# Patient Record
Sex: FEMALE | Race: WHITE | Employment: UNEMPLOYED | URBAN - METROPOLITAN AREA
[De-identification: names, ages, dates, MRNs, and addresses within clinical notes are randomized per-mention and may not be internally consistent; named-entity substitution may affect disease eponyms.]

---

## 2022-02-17 ENCOUNTER — OFFICE VISIT (OUTPATIENT)
Dept: PRIMARY CARE CLINIC | Age: 42
End: 2022-02-17
Payer: OTHER GOVERNMENT

## 2022-02-17 VITALS
OXYGEN SATURATION: 97 % | BODY MASS INDEX: 31.52 KG/M2 | SYSTOLIC BLOOD PRESSURE: 124 MMHG | WEIGHT: 208 LBS | HEIGHT: 68 IN | HEART RATE: 83 BPM | DIASTOLIC BLOOD PRESSURE: 84 MMHG

## 2022-02-17 DIAGNOSIS — I47.29 IDIOPATHIC VENTRICULAR TACHYCARDIA: ICD-10-CM

## 2022-02-17 DIAGNOSIS — Z80.3 FAMILY HISTORY OF BREAST CANCER: ICD-10-CM

## 2022-02-17 DIAGNOSIS — M79.7 FIBROMYALGIA: ICD-10-CM

## 2022-02-17 DIAGNOSIS — E55.9 VITAMIN D DEFICIENCY: ICD-10-CM

## 2022-02-17 DIAGNOSIS — M25.50 POLYARTHRALGIA: ICD-10-CM

## 2022-02-17 DIAGNOSIS — Z85.850 HISTORY OF THYROID CANCER: ICD-10-CM

## 2022-02-17 DIAGNOSIS — E03.9 HYPOTHYROIDISM, UNSPECIFIED TYPE: Primary | ICD-10-CM

## 2022-02-17 DIAGNOSIS — R23.2 HOT FLASHES: ICD-10-CM

## 2022-02-17 DIAGNOSIS — N39.3 SUI (STRESS URINARY INCONTINENCE, FEMALE): ICD-10-CM

## 2022-02-17 DIAGNOSIS — Z13.220 SCREENING FOR LIPID DISORDERS: ICD-10-CM

## 2022-02-17 PROBLEM — F32.A DEPRESSIVE DISORDER: Status: ACTIVE | Noted: 2017-02-01

## 2022-02-17 PROCEDURE — 99204 OFFICE O/P NEW MOD 45 MIN: CPT | Performed by: FAMILY MEDICINE

## 2022-02-17 RX ORDER — CARVEDILOL 25 MG/1
25 TABLET ORAL 2 TIMES DAILY
COMMUNITY
Start: 2022-01-24 | End: 2022-04-07 | Stop reason: SDUPTHER

## 2022-02-17 RX ORDER — LEVOTHYROXINE SODIUM 175 UG/1
175 TABLET ORAL DAILY
COMMUNITY
Start: 2022-01-28 | End: 2022-02-24

## 2022-02-17 SDOH — ECONOMIC STABILITY: FOOD INSECURITY: WITHIN THE PAST 12 MONTHS, YOU WORRIED THAT YOUR FOOD WOULD RUN OUT BEFORE YOU GOT MONEY TO BUY MORE.: NEVER TRUE

## 2022-02-17 SDOH — ECONOMIC STABILITY: FOOD INSECURITY: WITHIN THE PAST 12 MONTHS, THE FOOD YOU BOUGHT JUST DIDN'T LAST AND YOU DIDN'T HAVE MONEY TO GET MORE.: NEVER TRUE

## 2022-02-17 ASSESSMENT — ENCOUNTER SYMPTOMS
DIARRHEA: 0
ABDOMINAL PAIN: 0
BACK PAIN: 0
CONSTIPATION: 0
SORE THROAT: 0
COUGH: 0
CHEST TIGHTNESS: 0

## 2022-02-17 ASSESSMENT — PATIENT HEALTH QUESTIONNAIRE - PHQ9
SUM OF ALL RESPONSES TO PHQ9 QUESTIONS 1 & 2: 2
SUM OF ALL RESPONSES TO PHQ QUESTIONS 1-9: 2
2. FEELING DOWN, DEPRESSED OR HOPELESS: 1
1. LITTLE INTEREST OR PLEASURE IN DOING THINGS: 1

## 2022-02-17 ASSESSMENT — SOCIAL DETERMINANTS OF HEALTH (SDOH): HOW HARD IS IT FOR YOU TO PAY FOR THE VERY BASICS LIKE FOOD, HOUSING, MEDICAL CARE, AND HEATING?: NOT HARD AT ALL

## 2022-02-17 NOTE — PROGRESS NOTES
Subjective:     Patient ID: Vanessa Saldaña is a 39 y.o. female    HPI   This pleasant lady is new to the practice and here to get established today is from a [place] family recently moved back to this area after her 's long-term. She has a history of interventricular tachycardia and needs cardiology referral she also has a history of fibromyalgia fatigue brain fog muscle pain joint pain. She is a former nurse as well as a  lives with her  and 2 children has a history of thyroid cancer not sure of the cell type IVP that we talked about she has had a hysterectomy in the past because of ovarian cysts. Has a family history endometrial cancer ovarian cancer breast cancer. She complains of hot flashes fatigue palpitations. She had an echocardiogram that showed showed low ejection fraction. Currently taking Synthroid and carvedilol  Formally was a smoker she also drinks some alcohol and averages about 3 drinks a week. Denies being a drug user. Admits that her diet could be better that she could get more exercise      Review of Systems   Constitutional: Negative for activity change, appetite change, fatigue and fever. HENT: Negative for sore throat. Eyes: Negative for visual disturbance. Respiratory: Positive for shortness of breath. Negative for cough and chest tightness. Pneumonia 3 times     Exertional dyspnea        Snores    Poor sleep   Cardiovascular: Positive for palpitations. Negative for chest pain and leg swelling. Gastrointestinal: Negative for abdominal pain, constipation and diarrhea. Endocrine: Negative for cold intolerance. Genitourinary: Positive for frequency and urgency. Negative for dysuria. Hot flashes    Recurrent UTIs    Urgency    BETTY   Musculoskeletal: Positive for arthralgias. Negative for back pain. Neurological: Negative for dizziness, syncope and headaches. Hematological: Does not bruise/bleed easily.    Psychiatric/Behavioral: Positive for sleep disturbance. Negative for confusion and suicidal ideas. The patient is not nervous/anxious. Brain fog           Objective:     Physical Exam  Vitals and nursing note reviewed. Constitutional:       General: She is not in acute distress. Appearance: Normal appearance. She is obese. She is not ill-appearing. HENT:      Head: Normocephalic. Right Ear: External ear normal.      Left Ear: External ear normal.      Nose: Nose normal.      Mouth/Throat:      Mouth: Mucous membranes are moist.      Pharynx: Oropharynx is clear. Eyes:      Conjunctiva/sclera: Conjunctivae normal.      Pupils: Pupils are equal, round, and reactive to light. Cardiovascular:      Rate and Rhythm: Normal rate and regular rhythm. Pulses: Normal pulses. Heart sounds: Normal heart sounds. No murmur heard. Pulmonary:      Effort: Pulmonary effort is normal.      Breath sounds: Normal breath sounds. No wheezing. Musculoskeletal:         General: Normal range of motion. Cervical back: Neck supple. Right lower leg: No edema. Left lower leg: No edema. Lymphadenopathy:      Cervical: No cervical adenopathy. Skin:     General: Skin is warm and dry. Capillary Refill: Capillary refill takes less than 2 seconds. Neurological:      General: No focal deficit present. Mental Status: She is alert and oriented to person, place, and time. Psychiatric:         Mood and Affect: Mood normal.         Behavior: Behavior normal.         Assessment/Plan:     1. Hypothyroidism, unspecified type    2. Fibromyalgia    3. Idiopathic ventricular tachycardia ([address]    4. BMI 31.0-31.9,adult    5. Polyarthralgia    6. Hot flashes    7. BETTY (stress urinary incontinence, female)    8. Screening for lipid disorders    9. Vitamin D deficiency    10. History of thyroid cancer    11. Family history of breast cancer          Samantha Reynoso was seen today for [facility] care and other.     Diagnoses and all orders for this visit:    Hypothyroidism, unspecified type  -     T3, Free  -     T4, Free  -     TSH    Fibromyalgia  -     CBC with Auto Differential  -     Comprehensive Metabolic Panel  -     Sedimentation Rate    Idiopathic ventricular tachycardia (HCC)  -     CBC with Auto Differential  -     Comprehensive Metabolic Panel    BMI 29.1-84.7,WRCDL  -     CBC with Auto Differential  -     Comprehensive Metabolic Panel    Polyarthralgia  -     CBC with Auto Differential  -     Comprehensive Metabolic Panel  -     Sedimentation Rate    Hot flashes  -     Comprehensive Metabolic Panel  -     Estradiol  -     Progesterone  -     DHEA-Sulfate    BETTY (stress urinary incontinence, female)  -     Urinalysis    Screening for lipid disorders  -     Lipid Panel    Vitamin D deficiency  -     Vitamin D 25 Hydroxy    History of thyroid cancer    Family history of breast cancer      Continue her same medications for now. We will check her lab work and review her record in more depth. Return to office to discuss her labs and started treatment plan including weight loss. Discussed simple 7 anti-inflammatory lifestyle and low glycemic index    Pinky Brian MD    Please note that this chart was generated using voice recognition Dragon dictation software. Although every effort was made to ensure the accuracy of this automated transcription, some errors in transcription may have occurred.

## 2022-02-21 ASSESSMENT — ENCOUNTER SYMPTOMS: SHORTNESS OF BREATH: 1

## 2022-02-22 NOTE — PATIENT INSTRUCTIONS
We discussed the anti-inflammatory diet and low glycemic index. We also discussed carbohydrate to fiber ratio no higher than 10-1. We discussed no naked carbohydrates. Discussed the headspace meditation cachorro. Discussed 8 fit cachorro for HIIT. Discussed 150 minutes of moderate exercise weekly  Reinforced need for quality sleep  Specific for the insomnia add magnesium 400 mg 2 hours before bedtime, OLLI an  hour before bedtime, DHA fish oil 1 g in the morning.   Simple 7 goals of lifestyle modification reviewed with patient  Handout with explanations given and reviewed  Questions answered  Patient verbalized understanding

## 2022-02-23 ENCOUNTER — HOSPITAL ENCOUNTER (OUTPATIENT)
Age: 42
Setting detail: SPECIMEN
Discharge: HOME | End: 2022-02-23

## 2022-02-23 LAB
ABSOLUTE EOS #: 0.15 K/UL (ref 0–0.44)
ABSOLUTE IMMATURE GRANULOCYTE: <0.03 K/UL (ref 0–0.3)
ABSOLUTE LYMPH #: 1.93 K/UL (ref 1.1–3.7)
ABSOLUTE MONO #: 0.32 K/UL (ref 0.1–1.2)
ALBUMIN SERPL-MCNC: 4.6 G/DL (ref 3.5–5.2)
ALBUMIN/GLOBULIN RATIO: 1.8 (ref 1–2.5)
ALP BLD-CCNC: 71 U/L (ref 35–104)
ALT SERPL-CCNC: 52 U/L (ref 5–33)
ANION GAP SERPL CALCULATED.3IONS-SCNC: 13 MMOL/L (ref 9–17)
AST SERPL-CCNC: 33 U/L
BASOPHILS # BLD: 1 % (ref 0–2)
BASOPHILS ABSOLUTE: 0.05 K/UL (ref 0–0.2)
BILIRUB SERPL-MCNC: 0.26 MG/DL (ref 0.3–1.2)
BILIRUBIN URINE: NEGATIVE
BUN BLDV-MCNC: 13 MG/DL (ref 6–20)
CALCIUM SERPL-MCNC: 9.3 MG/DL (ref 8.6–10.4)
CHLORIDE BLD-SCNC: 101 MMOL/L (ref 98–107)
CHOLESTEROL/HDL RATIO: 4.6
CHOLESTEROL: 256 MG/DL
CO2: 24 MMOL/L (ref 20–31)
COLOR: YELLOW
COMMENT UA: NORMAL
CREAT SERPL-MCNC: 0.63 MG/DL (ref 0.5–0.9)
EOSINOPHILS RELATIVE PERCENT: 3 % (ref 1–4)
ESTRADIOL LEVEL: <5 PG/ML (ref 27–314)
GFR AFRICAN AMERICAN: >60 ML/MIN
GFR NON-AFRICAN AMERICAN: >60 ML/MIN
GFR SERPL CREATININE-BSD FRML MDRD: ABNORMAL ML/MIN/{1.73_M2}
GLUCOSE BLD-MCNC: 93 MG/DL (ref 70–99)
GLUCOSE URINE: NEGATIVE
HCT VFR BLD CALC: 40.1 % (ref 36.3–47.1)
HDLC SERPL-MCNC: 56 MG/DL
HEMOGLOBIN: 13.3 G/DL (ref 11.9–15.1)
IMMATURE GRANULOCYTES: 0 %
KETONES, URINE: NEGATIVE
LDL CHOLESTEROL: 185 MG/DL (ref 0–130)
LEUKOCYTE ESTERASE, URINE: NEGATIVE
LYMPHOCYTES # BLD: 39 % (ref 24–43)
MCH RBC QN AUTO: 29.7 PG (ref 25.2–33.5)
MCHC RBC AUTO-ENTMCNC: 33.2 G/DL (ref 28.4–34.8)
MCV RBC AUTO: 89.5 FL (ref 82.6–102.9)
MONOCYTES # BLD: 6 % (ref 3–12)
NITRITE, URINE: NEGATIVE
NRBC AUTOMATED: 0 PER 100 WBC
PDW BLD-RTO: 12.5 % (ref 11.8–14.4)
PH UA: 6 (ref 5–8)
PLATELET # BLD: 196 K/UL (ref 138–453)
PMV BLD AUTO: 12.3 FL (ref 8.1–13.5)
POTASSIUM SERPL-SCNC: 4.5 MMOL/L (ref 3.7–5.3)
PROGESTERONE LEVEL: <0.05 NG/ML
PROTEIN UA: NEGATIVE
RBC # BLD: 4.48 M/UL (ref 3.95–5.11)
SEDIMENTATION RATE, ERYTHROCYTE: 3 MM (ref 0–20)
SEG NEUTROPHILS: 51 % (ref 36–65)
SEGMENTED NEUTROPHILS ABSOLUTE COUNT: 2.54 K/UL (ref 1.5–8.1)
SODIUM BLD-SCNC: 138 MMOL/L (ref 135–144)
SPECIFIC GRAVITY UA: 1.01 (ref 1–1.03)
T3 FREE: 2.89 PG/ML (ref 2.02–4.43)
THYROXINE, FREE: 1.77 NG/DL (ref 0.93–1.7)
TOTAL PROTEIN: 7.2 G/DL (ref 6.4–8.3)
TRIGL SERPL-MCNC: 74 MG/DL
TSH SERPL DL<=0.05 MIU/L-ACNC: 0.62 MIU/L (ref 0.3–5)
TURBIDITY: CLEAR
URINE HGB: NEGATIVE
UROBILINOGEN, URINE: NORMAL
VITAMIN D 25-HYDROXY: 20 NG/ML (ref 30–100)
WBC # BLD: 5 K/UL (ref 3.5–11.3)

## 2022-02-24 LAB — DHEAS (DHEA SULFATE): 142 UG/DL (ref 32–240)

## 2022-02-24 RX ORDER — ERGOCALCIFEROL 1.25 MG/1
50000 CAPSULE ORAL WEEKLY
Qty: 4 CAPSULE | Refills: 5 | Status: SHIPPED | OUTPATIENT
Start: 2022-02-24 | End: 2022-05-16 | Stop reason: SDUPTHER

## 2022-02-24 RX ORDER — LEVOTHYROXINE SODIUM 137 UG/1
137 TABLET ORAL DAILY
Qty: 30 TABLET | Refills: 5 | Status: SHIPPED | OUTPATIENT
Start: 2022-02-24 | End: 2022-03-10 | Stop reason: SDUPTHER

## 2022-03-10 ENCOUNTER — OFFICE VISIT (OUTPATIENT)
Dept: PRIMARY CARE CLINIC | Age: 42
End: 2022-03-10
Payer: OTHER GOVERNMENT

## 2022-03-10 VITALS
OXYGEN SATURATION: 99 % | WEIGHT: 205.4 LBS | HEART RATE: 94 BPM | HEIGHT: 68 IN | BODY MASS INDEX: 31.13 KG/M2 | DIASTOLIC BLOOD PRESSURE: 78 MMHG | SYSTOLIC BLOOD PRESSURE: 124 MMHG

## 2022-03-10 DIAGNOSIS — R00.0 TACHYCARDIA: ICD-10-CM

## 2022-03-10 DIAGNOSIS — R23.2 HOT FLASHES: ICD-10-CM

## 2022-03-10 DIAGNOSIS — E03.9 HYPOTHYROIDISM, UNSPECIFIED TYPE: Primary | ICD-10-CM

## 2022-03-10 DIAGNOSIS — E78.2 MIXED HYPERLIPIDEMIA: ICD-10-CM

## 2022-03-10 DIAGNOSIS — R00.2 PALPITATIONS: ICD-10-CM

## 2022-03-10 PROCEDURE — 99214 OFFICE O/P EST MOD 30 MIN: CPT | Performed by: FAMILY MEDICINE

## 2022-03-10 RX ORDER — LEVOTHYROXINE SODIUM 137 UG/1
137 TABLET ORAL DAILY
Qty: 30 TABLET | Refills: 5 | Status: SHIPPED | OUTPATIENT
Start: 2022-03-10 | End: 2022-09-06 | Stop reason: SDUPTHER

## 2022-03-10 RX ORDER — ROSUVASTATIN CALCIUM 10 MG/1
10 TABLET, COATED ORAL DAILY
Qty: 30 TABLET | Refills: 3 | Status: SHIPPED | OUTPATIENT
Start: 2022-03-10 | End: 2022-06-06

## 2022-03-10 RX ORDER — PROGESTERONE 100 MG/1
100 CAPSULE ORAL NIGHTLY
Qty: 30 CAPSULE | Refills: 2 | Status: SHIPPED | OUTPATIENT
Start: 2022-03-10 | End: 2022-06-20 | Stop reason: SDUPTHER

## 2022-03-10 RX ORDER — CLINDAMYCIN HYDROCHLORIDE 300 MG/1
CAPSULE ORAL
COMMUNITY
Start: 2022-03-04 | End: 2022-06-06 | Stop reason: ALTCHOICE

## 2022-03-10 ASSESSMENT — PATIENT HEALTH QUESTIONNAIRE - PHQ9
SUM OF ALL RESPONSES TO PHQ QUESTIONS 1-9: 0
SUM OF ALL RESPONSES TO PHQ9 QUESTIONS 1 & 2: 0
SUM OF ALL RESPONSES TO PHQ QUESTIONS 1-9: 0
SUM OF ALL RESPONSES TO PHQ QUESTIONS 1-9: 0
2. FEELING DOWN, DEPRESSED OR HOPELESS: 0
1. LITTLE INTEREST OR PLEASURE IN DOING THINGS: 0
SUM OF ALL RESPONSES TO PHQ QUESTIONS 1-9: 0

## 2022-03-10 NOTE — PROGRESS NOTES
swelling. Gastrointestinal: Negative for abdominal pain, constipation and diarrhea. Endocrine: Negative for cold intolerance. Genitourinary: Positive for frequency and urgency. Negative for dysuria. Hot flashes    Recurrent UTIs    Urgency    BETTY   Musculoskeletal: Positive for arthralgias. Negative for back pain. Neurological: Negative for dizziness, syncope and headaches. Hematological: Does not bruise/bleed easily. Psychiatric/Behavioral: Positive for sleep disturbance. Negative for confusion and suicidal ideas. The patient is not nervous/anxious. Brain fog           Objective:     Physical Exam  Vitals and nursing note reviewed. Constitutional:       General: She is not in acute distress. Appearance: Normal appearance. She is obese. She is not ill-appearing. HENT:      Head: Normocephalic. Right Ear: External ear normal.      Left Ear: External ear normal.      Nose: Nose normal.      Mouth/Throat:      Mouth: Mucous membranes are moist.      Pharynx: Oropharynx is clear. Eyes:      Conjunctiva/sclera: Conjunctivae normal.      Pupils: Pupils are equal, round, and reactive to light. Cardiovascular:      Rate and Rhythm: Normal rate and regular rhythm. Pulses: Normal pulses. Heart sounds: Normal heart sounds. No murmur heard. Pulmonary:      Effort: Pulmonary effort is normal.      Breath sounds: Normal breath sounds. No wheezing. Musculoskeletal:         General: Normal range of motion. Cervical back: Neck supple. Right lower leg: No edema. Left lower leg: No edema. Lymphadenopathy:      Cervical: No cervical adenopathy. Skin:     General: Skin is warm and dry. Capillary Refill: Capillary refill takes less than 2 seconds. Neurological:      General: No focal deficit present. Mental Status: She is alert and oriented to person, place, and time.    Psychiatric:         Mood and Affect: Mood normal.         Behavior: Behavior normal.       The 10-year ASCVD risk score (Geo Mercado, et al., 2013) is: 0.9%    Values used to calculate the score:      Age: 39 years      Sex: Female      Is Non- : No      Diabetic: No      Tobacco smoker: No      Systolic Blood Pressure: 730 mmHg      Is BP treated: No      HDL Cholesterol: 56 mg/dL      Total Cholesterol: 256 mg/dL    Lab Results   Component Value Date     02/23/2022    K 4.5 02/23/2022     02/23/2022    CO2 24 02/23/2022    BUN 13 02/23/2022    CREATININE 0.63 02/23/2022    GLUCOSE 93 02/23/2022    CALCIUM 9.3 02/23/2022    PROT 7.2 02/23/2022    LABALBU 4.6 02/23/2022    BILITOT 0.26 (L) 02/23/2022    ALKPHOS 71 02/23/2022    AST 33 (H) 02/23/2022    ALT 52 (H) 02/23/2022    LABGLOM >60 02/23/2022    GFRAA >60 02/23/2022     Lab Results   Component Value Date    WBC 5.0 02/23/2022    HGB 13.3 02/23/2022    HCT 40.1 02/23/2022    MCV 89.5 02/23/2022     02/23/2022      Lab Results   Component Value Date    CHOL 256 (H) 02/23/2022     Lab Results   Component Value Date    TRIG 74 02/23/2022     Lab Results   Component Value Date    HDL 56 02/23/2022     Lab Results   Component Value Date    LDLCHOLESTEROL 185 (H) 02/23/2022     No results found for: LABVLDL, VLDL  Lab Results   Component Value Date    CHOLHDLRATIO 4.6 02/23/2022      Lab Results   Component Value Date    COLORU Yellow 02/23/2022    NITRU NEGATIVE 02/23/2022    GLUCOSEU NEGATIVE 02/23/2022    KETUA NEGATIVE 02/23/2022    UROBILINOGEN Normal 02/23/2022    BILIRUBINUR NEGATIVE 02/23/2022      Assessment/Plan:     1. Hypothyroidism, unspecified type    2. Palpitations    3. Hot flashes    4. Tachycardia    5. Mixed hyperlipidemia          Aria Boateng was seen today for other, hypothyroidism and chronic pain.     Diagnoses and all orders for this visit:    Hypothyroidism, unspecified type  -     T4, Free; Future  Synthroid 137 mcg on alternate days with 175  Palpitations  -     Echocardiogram complete; Future      Hot flashes  Trial on progesterone  Tachycardia  -     Echocardiogram complete; Future    Mixed hyperlipidemia  Start Crestor  Other orders  -     progesterone (PROMETRIUM) 100 MG CAPS capsule; Take 1 capsule by mouth nightly Take daily  -     levothyroxine (SYNTHROID) 137 MCG tablet; Take 1 tablet by mouth daily  -     rosuvastatin (CRESTOR) 10 MG tablet; Take 1 tablet by mouth daily    We will recheck her thyroid in 2 months then return the office  See patient instructions  Spent 30 minutes including time with the patient, reviewing the patient's chart, reviewing lab work, images and considering changes in treatment plan. Jolly Raza MD    Please note that this chart was generated using voice recognition Dragon dictation software. Although every effort was made to ensure the accuracy of this automated transcription, some errors in transcription may have occurred.

## 2022-03-13 ASSESSMENT — ENCOUNTER SYMPTOMS
CHEST TIGHTNESS: 0
ABDOMINAL PAIN: 0
DIARRHEA: 0
CONSTIPATION: 0
COUGH: 0
SORE THROAT: 0
SHORTNESS OF BREATH: 1
BACK PAIN: 0

## 2022-03-18 ENCOUNTER — HOSPITAL ENCOUNTER (OUTPATIENT)
Dept: NON INVASIVE DIAGNOSTICS | Age: 42
Discharge: HOME | End: 2022-03-20
Payer: OTHER GOVERNMENT

## 2022-03-18 DIAGNOSIS — R00.2 PALPITATIONS: ICD-10-CM

## 2022-03-18 DIAGNOSIS — R00.0 TACHYCARDIA: ICD-10-CM

## 2022-03-18 LAB
LV EF: 50 %
LVEF MODALITY: NORMAL

## 2022-03-18 PROCEDURE — 93306 TTE W/DOPPLER COMPLETE: CPT

## 2022-04-07 ENCOUNTER — PATIENT MESSAGE (OUTPATIENT)
Dept: PRIMARY CARE CLINIC | Age: 42
End: 2022-04-07

## 2022-04-07 RX ORDER — CARVEDILOL 25 MG/1
25 TABLET ORAL 2 TIMES DAILY
Qty: 60 TABLET | Refills: 1 | Status: SHIPPED | OUTPATIENT
Start: 2022-04-07 | End: 2022-06-13 | Stop reason: SDUPTHER

## 2022-04-07 NOTE — TELEPHONE ENCOUNTER
From: Doe Kohler  To: Dr. Tapia Kelby: 4/7/2022 10:59 AM EDT  Subject: Prescription request     I am running low on my carvedilol. Can you please send a new script to the [facility] in [place]? Im on 25 mg 2x daily currently. If you can send a 2 month supply that will get me through until my next appointment. Thanks.

## 2022-04-27 ENCOUNTER — PATIENT MESSAGE (OUTPATIENT)
Dept: PRIMARY CARE CLINIC | Age: 42
End: 2022-04-27

## 2022-04-27 RX ORDER — LEVOTHYROXINE SODIUM 175 UG/1
175 TABLET ORAL DAILY
Qty: 30 TABLET | Refills: 1 | Status: SHIPPED | OUTPATIENT
Start: 2022-04-27 | End: 2022-08-15 | Stop reason: SDUPTHER

## 2022-04-27 NOTE — TELEPHONE ENCOUNTER
From: Shareen Apley  To: Dr. Keerthi Dickey: 4/27/2022 12:38 PM EDT  Subject: Prescription refill needed    I will run out of my Levothyroxine 175 MCG before my scheduled appointment. Please send a script to [facility] for me to fill. I would appreciate a 30 day supply to get me through my appointment. I know I have labs coming up so the dosage may change at the appointment. Thanks.

## 2022-05-09 ENCOUNTER — HOSPITAL ENCOUNTER (OUTPATIENT)
Age: 42
Setting detail: SPECIMEN
Discharge: HOME | End: 2022-05-09

## 2022-05-09 DIAGNOSIS — R00.2 PALPITATIONS: ICD-10-CM

## 2022-05-09 DIAGNOSIS — E03.9 HYPOTHYROIDISM, UNSPECIFIED TYPE: ICD-10-CM

## 2022-05-09 LAB — THYROXINE, FREE: 1.57 NG/DL (ref 0.93–1.7)

## 2022-05-12 ENCOUNTER — OFFICE VISIT (OUTPATIENT)
Dept: PRIMARY CARE CLINIC | Age: 42
End: 2022-05-12
Payer: OTHER GOVERNMENT

## 2022-05-12 ENCOUNTER — PATIENT MESSAGE (OUTPATIENT)
Dept: PRIMARY CARE CLINIC | Age: 42
End: 2022-05-12

## 2022-05-12 ENCOUNTER — HOSPITAL ENCOUNTER (OUTPATIENT)
Age: 42
Setting detail: SPECIMEN
Discharge: HOME | End: 2022-05-12

## 2022-05-12 VITALS
OXYGEN SATURATION: 98 % | WEIGHT: 214.3 LBS | HEIGHT: 68 IN | DIASTOLIC BLOOD PRESSURE: 68 MMHG | SYSTOLIC BLOOD PRESSURE: 110 MMHG | BODY MASS INDEX: 32.48 KG/M2 | HEART RATE: 83 BPM

## 2022-05-12 DIAGNOSIS — E55.9 VITAMIN D DEFICIENCY: ICD-10-CM

## 2022-05-12 DIAGNOSIS — Z82.49 FAMILY HISTORY OF HEART DISEASE: ICD-10-CM

## 2022-05-12 DIAGNOSIS — E03.9 HYPOTHYROIDISM, UNSPECIFIED TYPE: ICD-10-CM

## 2022-05-12 DIAGNOSIS — R00.2 PALPITATIONS: ICD-10-CM

## 2022-05-12 DIAGNOSIS — R53.83 FATIGUE, UNSPECIFIED TYPE: ICD-10-CM

## 2022-05-12 DIAGNOSIS — R06.09 EXERTIONAL DYSPNEA: Primary | ICD-10-CM

## 2022-05-12 LAB — VITAMIN D 25-HYDROXY: 26.8 NG/ML

## 2022-05-12 PROCEDURE — 99214 OFFICE O/P EST MOD 30 MIN: CPT | Performed by: FAMILY MEDICINE

## 2022-05-12 ASSESSMENT — PATIENT HEALTH QUESTIONNAIRE - PHQ9
SUM OF ALL RESPONSES TO PHQ QUESTIONS 1-9: 2
1. LITTLE INTEREST OR PLEASURE IN DOING THINGS: 1
2. FEELING DOWN, DEPRESSED OR HOPELESS: 1
SUM OF ALL RESPONSES TO PHQ QUESTIONS 1-9: 2
SUM OF ALL RESPONSES TO PHQ9 QUESTIONS 1 & 2: 2

## 2022-05-12 NOTE — PROGRESS NOTES
Subjective:     Patient ID: Haseeb Leone is a 39 y.o. female    HPI  Patient returns today for follow-up on her hypothyroidism hyperlipidemia palpitations fatigue shortness of breath and weight gain. At her last visit and in response to her lab work we modified her Synthroid dose as recorded in the chart such that she did not waste any pills. We will recheck her thyroid levels 3 months after the dose adjustment. Seems to be tolerating it well and she does feel somewhat better. We discussed her hyperlipidemia. Most concerning to her is her shortness of breath. Any type of exertion including grocery shopping seems to exhaust her. She has chest tightness and shortness of breath even just walking from the parking lot. The chest pain is not crushing as with think of with angina and it does not radiate. More shortness of breath and fatigue. Reviewed her echocardiogram from before and she has adequate systolic function. However with this expression of exertional dyspnea and fatigue I think it is important that we do a stress test on her. She does have a family history of heart disease      Review of Systems   Constitutional: Positive for fatigue and unexpected weight change. Negative for fever. HENT: Negative for sore throat. Respiratory: Positive for chest tightness and shortness of breath. Negative for cough, choking and wheezing. Cardiovascular: Positive for chest pain and palpitations. Gastrointestinal: Negative for abdominal pain. Genitourinary: Negative for dysuria. Musculoskeletal: Negative for arthralgias. Neurological: Negative for light-headedness and headaches. Psychiatric/Behavioral: Negative for sleep disturbance. The patient is not nervous/anxious. Objective:     Physical Exam  Vitals and nursing note reviewed. Constitutional:       Appearance: Normal appearance. HENT:      Head: Normocephalic.       Right Ear: External ear normal.      Left Ear: External ear normal.      Nose: Nose normal.      Mouth/Throat:      Mouth: Mucous membranes are moist.      Pharynx: Oropharynx is clear. Eyes:      Conjunctiva/sclera: Conjunctivae normal.      Pupils: Pupils are equal, round, and reactive to light. Cardiovascular:      Rate and Rhythm: Normal rate and regular rhythm. Pulses: Normal pulses. Heart sounds: Normal heart sounds. No murmur heard. No gallop. Pulmonary:      Effort: Pulmonary effort is normal. No respiratory distress. Breath sounds: Normal breath sounds. No wheezing or rhonchi. Musculoskeletal:         General: Normal range of motion. Cervical back: Neck supple. Right lower leg: No edema. Left lower leg: No edema. Lymphadenopathy:      Cervical: No cervical adenopathy. Skin:     General: Skin is warm and dry. Capillary Refill: Capillary refill takes less than 2 seconds. Neurological:      General: No focal deficit present. Mental Status: She is alert and oriented to person, place, and time. Psychiatric:         Mood and Affect: Mood normal.         Behavior: Behavior normal.         Assessment/Plan:     1. Exertional dyspnea    2. Fatigue, unspecified type    3. Hypothyroidism, unspecified type    4. Palpitations    5. Family history of heart disease    6. Vitamin D deficiency          Deborrkarissa El was seen today for hypothyroidism, cholesterol problem, palpitations, fatigue, shortness of breath and weight gain. Diagnoses and all orders for this visit:    Exertional dyspnea  -     Cardiac Stress Test - w/Pharm; Future    Fatigue, unspecified type  -     Vitamin D 25 Hydroxy; Future  -     Cardiac Stress Test - w/Pharm; Future    Hypothyroidism, unspecified type  Continue dose of Synthroid as instructed  Palpitations  -     Cardiac Stress Test - w/Pharm; Future    Family history of heart disease  -     Cardiac Stress Test - w/Pharm; Future    Vitamin D deficiency  -     Vitamin D 25 Hydroxy;  Future    Meds are reviewed answered questions return to office to discuss results of stress test      Johnny Ramos MD    Please note that this chart was generated using voice recognition Dragon dictation software. Although every effort was made to ensure the accuracy of this automated transcription, some errors in transcription may have occurred.

## 2022-05-13 NOTE — TELEPHONE ENCOUNTER
From: Mayda Rangel  To: Dr. Buckner Hole: 5/12/2022 5:08 PM EDT  Subject: Question regarding VITAMIN D 25 HYDROXY    Ill need an new script for the weekly dose, and a script for the daily.  Thanks

## 2022-05-16 RX ORDER — ERGOCALCIFEROL 1.25 MG/1
50000 CAPSULE ORAL WEEKLY
Qty: 4 CAPSULE | Refills: 5 | Status: SHIPPED | OUTPATIENT
Start: 2022-05-16 | End: 2022-09-06 | Stop reason: SDUPTHER

## 2022-05-17 ASSESSMENT — ENCOUNTER SYMPTOMS
ABDOMINAL PAIN: 0
SORE THROAT: 0
CHOKING: 0
CHEST TIGHTNESS: 1
SHORTNESS OF BREATH: 1
WHEEZING: 0
COUGH: 0

## 2022-05-19 ENCOUNTER — OFFICE VISIT (OUTPATIENT)
Dept: PRIMARY CARE CLINIC | Age: 42
End: 2022-05-19
Payer: OTHER GOVERNMENT

## 2022-05-19 VITALS
HEIGHT: 68 IN | BODY MASS INDEX: 32.52 KG/M2 | OXYGEN SATURATION: 98 % | HEART RATE: 96 BPM | SYSTOLIC BLOOD PRESSURE: 110 MMHG | WEIGHT: 214.6 LBS | DIASTOLIC BLOOD PRESSURE: 68 MMHG

## 2022-05-19 DIAGNOSIS — M54.6 ACUTE RIGHT-SIDED THORACIC BACK PAIN: Primary | ICD-10-CM

## 2022-05-19 DIAGNOSIS — S29.011A INTERCOSTAL MUSCLE STRAIN, INITIAL ENCOUNTER: ICD-10-CM

## 2022-05-19 PROCEDURE — 99213 OFFICE O/P EST LOW 20 MIN: CPT

## 2022-05-19 PROCEDURE — 96372 THER/PROPH/DIAG INJ SC/IM: CPT

## 2022-05-19 RX ORDER — CYCLOBENZAPRINE HCL 10 MG
10 TABLET ORAL 3 TIMES DAILY PRN
Qty: 21 TABLET | Refills: 0 | Status: SHIPPED | OUTPATIENT
Start: 2022-05-19 | End: 2022-05-29

## 2022-05-19 RX ORDER — TRIAMCINOLONE ACETONIDE 40 MG/ML
40 INJECTION, SUSPENSION INTRA-ARTICULAR; INTRAMUSCULAR ONCE
Status: COMPLETED | OUTPATIENT
Start: 2022-05-19 | End: 2022-05-19

## 2022-05-19 RX ORDER — IBUPROFEN 600 MG/1
600 TABLET ORAL 4 TIMES DAILY PRN
Qty: 120 TABLET | Refills: 0 | Status: SHIPPED | OUTPATIENT
Start: 2022-05-19

## 2022-05-19 RX ADMIN — TRIAMCINOLONE ACETONIDE 40 MG: 40 INJECTION, SUSPENSION INTRA-ARTICULAR; INTRAMUSCULAR at 12:34

## 2022-05-19 ASSESSMENT — ENCOUNTER SYMPTOMS
ABDOMINAL PAIN: 0
NAUSEA: 0
BOWEL INCONTINENCE: 0
SHORTNESS OF BREATH: 0
VOMITING: 0
BACK PAIN: 1
COUGH: 0

## 2022-05-19 NOTE — PROGRESS NOTES
[address] WALK-IN  [address] [place] [address] WALK-IN  [address],  [address]  [address] [place] [address]  Dept: [phone]    Kim Burks is a 39 y.o. female Established patient, who presents to the walk-in clinic today with conditions/complaints as noted below:    Chief Complaint   Patient presents with    Lower Back Pain     had massage  on Monday, moving furniture today now mid back pain. no truama         HPI:     Back Pain  This is a new problem. The current episode started in the past 7 days (on Monday she felt a \"pop\" during her massage). The problem occurs constantly. The problem has been gradually worsening (after moving furniture today) since onset. The pain is present in the thoracic spine. The quality of the pain is described as stabbing and cramping. Radiates to: right ribs/lateral chest wall. The pain is at a severity of 3/10. The pain is the same all the time. The symptoms are aggravated by twisting and bending (lifting). Pertinent negatives include no abdominal pain, bladder incontinence, bowel incontinence, chest pain, dysuria, fever, leg pain, numbness, perianal numbness, tingling or weakness. She has tried analgesics, NSAIDs and bed rest (acetaminophen, advil) for the symptoms. The treatment provided no relief.        Past Medical History:   Diagnosis Date    Allergic rhinitis 2005    Anxiety 1999    Cancer [facility])     thyroid    Chronic back pain 2000    Depression     hx of depression and panic attacks, not bad since around 2010    Fibromyalgia     Headache 1990    Hypothyroidism 2002    Idiopathic ventricular tachy[address] ([facility] [address]     Obesity 2002       Current Outpatient Medications   Medication Sig Dispense Refill    ibuprofen (ADVIL;MOTRIN) 600 MG tablet Take 1 tablet by mouth 4 times daily as needed for Pain 120 tablet 0  cyclobenzaprine (FLEXERIL) 10 MG tablet Take 1 tablet by mouth 3 times daily as needed for Muscle spasms 21 tablet 0    vitamin D (ERGOCALCIFEROL) 1.25 MG (63975 UT) CAPS capsule Take 1 capsule by mouth once a week 4 capsule 5    levothyroxine (SYNTHROID) 175 MCG tablet Take 1 tablet by mouth daily (Patient taking differently: Take 175 mcg by mouth daily 4x/week) 30 tablet 1    carvedilol (COREG) 25 MG tablet Take 1 tablet by mouth 2 times daily 60 tablet 1    progesterone (PROMETRIUM) 100 MG CAPS capsule Take 1 capsule by mouth nightly Take daily 30 capsule 2    levothyroxine (SYNTHROID) 137 MCG tablet Take 1 tablet by mouth daily (Patient taking differently: Take 137 mcg by mouth daily 3x/week) 30 tablet 5    clindamycin (CLEOCIN) 300 MG capsule  (Patient not taking: Reported on 5/12/2022)      rosuvastatin (CRESTOR) 10 MG tablet Take 1 tablet by mouth daily (Patient taking differently: Take 10 mg by mouth daily 3x/week) 30 tablet 3     Current Facility-Administered Medications   Medication Dose Route Frequency Provider Last Rate Last Admin    triamcinolone acetonide (KENALOG-40) injection 40 mg  40 mg IntraMUSCular Once SILKE Pro - CNP           Allergies   Allergen Reactions    Cephalexin     Sulfa Antibiotics        :     Review of Systems   Constitutional: Positive for activity change. Negative for chills and fever. Respiratory: Negative for cough and shortness of breath. Cardiovascular: Negative for chest pain. Gastrointestinal: Negative for abdominal pain, bowel incontinence, nausea and vomiting. Genitourinary: Negative for bladder incontinence, difficulty urinating, dysuria, flank pain and hematuria. Musculoskeletal: Positive for back pain and myalgias. Skin: Negative for rash.    Neurological: Negative for tingling, weakness and numbness.       :     /68   Pulse 96   Ht 5' 8\" (1.727 m)   Wt 214 lb 9.6 oz (97.3 kg)   SpO2 98%   BMI 32.63 kg/m²     Physical Exam  Vitals reviewed. Constitutional:       General: She is not in acute distress. Appearance: Normal appearance. She is obese. Cardiovascular:      Rate and Rhythm: Normal rate and regular rhythm. Pulses: Normal pulses. Heart sounds: Normal heart sounds. No murmur heard. Pulmonary:      Effort: Pulmonary effort is normal.      Breath sounds: Normal breath sounds. Chest:      Chest wall: Tenderness present. No mass, deformity, swelling or crepitus. Abdominal:      General: Bowel sounds are normal.      Palpations: Abdomen is soft. Tenderness: There is no abdominal tenderness. There is no right CVA tenderness or left CVA tenderness. Musculoskeletal:      Cervical back: Normal range of motion and neck supple. No tenderness. Thoracic back: Tenderness (right paraspinal) present. No swelling, deformity, signs of trauma or bony tenderness. Lumbar back: Normal.   Skin:     General: Skin is warm and dry. Findings: No rash. Neurological:      General: No focal deficit present. Mental Status: She is alert and oriented to person, place, and time. Psychiatric:         Mood and Affect: Mood normal.         Behavior: Behavior normal.           :          1. Acute right-sided thoracic back pain  -     ibuprofen (ADVIL;MOTRIN) 600 MG tablet; Take 1 tablet by mouth 4 times daily as needed for Pain, Disp-120 tablet, R-0Normal  -     triamcinolone acetonide (KENALOG-40) injection 40 mg; 40 mg, IntraMUSCular, ONCE, 1 dose, On u 5/19/22 at 1230  -     cyclobenzaprine (FLEXERIL) 10 MG tablet; Take 1 tablet by mouth 3 times daily as needed for Muscle spasms, Disp-21 tablet, R-0Normal  2. Intercostal muscle strain, initial encounter  -     ibuprofen (ADVIL;MOTRIN) 600 MG tablet;  Take 1 tablet by mouth 4 times daily as needed for Pain, Disp-120 tablet, R-0Normal  -     triamcinolone acetonide (KENALOG-40) injection 40 mg; 40 mg, IntraMUSCular, ONCE, 1 dose, On u 5/19/22 at 1230  -     cyclobenzaprine (FLEXERIL) 10 MG tablet; Take 1 tablet by mouth 3 times daily as needed for Muscle spasms, Disp-21 tablet, R-0Normal       :      Return if symptoms worsen or fail to improve. Orders Placed This Encounter   Medications    ibuprofen (ADVIL;MOTRIN) 600 MG tablet     Sig: Take 1 tablet by mouth 4 times daily as needed for Pain     Dispense:  120 tablet     Refill:  0    triamcinolone acetonide (KENALOG-40) injection 40 mg    cyclobenzaprine (FLEXERIL) 10 MG tablet     Sig: Take 1 tablet by mouth 3 times daily as needed for Muscle spasms     Dispense:  21 tablet     Refill:  0      IM injection of Kenalog given in office, patient tolerated. Advised rest and ice/heat application. Use ibuprofen for pain/inflammation. May use flexeril as needed for muscle spasms. Advised patient on potential for sedation, no driving/machinery use, or alcohol while taking the medication. Avoid aggravating/irritating movements. Advance with gradual stretching as tolerated. Follow-up with PCP. Patient and/or parent given educational materials - see patient instructions. Discussed use, benefit, and side effects of prescribed medications. All patient questions answered. Patient and/or parent voiced understanding.       Electronically signed by SILKE Pressley 5/19/2022 at 12:15 PM

## 2022-05-19 NOTE — PATIENT INSTRUCTIONS
Recommend rest and ice/heat application. Use Motrin for pain/inflammation. Flexeril as needed for muscle spasms. Avoid aggravating movements. Gentle stretching as tolerated. Patient Education        Back Pain: Care Instructions  Your Care Instructions     Back pain has many possible causes. It is often related to problems with muscles and ligaments of the back. It may also be related to problems with the nerves, discs, or bones of the back. Moving, lifting, standing, sitting, or sleeping in an awkward way can strain the back. Sometimes you don't notice theinjury until later. Arthritis is another common cause of back pain. Although it may hurt a lot, back pain usually improves on its own within several weeks. Most people recover in 12 weeks or less. Using good home treatment and being careful not to stress your back can help you feel bettersooner. Follow-up care is a key part of your treatment and safety. Be sure to make and go to all appointments, and call your doctor if you are having problems. It's also a good idea to know your test results and keep alist of the medicines you take. How can you care for yourself at home?  Sit or lie in positions that are most comfortable and reduce your pain. Try one of these positions when you lie down:  ? Lie on your back with your knees bent and supported by large pillows. ? Lie on the floor with your legs on the seat of a sofa or chair. ? Lie on your side with your knees and hips bent and a pillow between your legs. ? Lie on your stomach if it does not make pain worse.  Do not sit up in bed, and avoid soft couches and twisted positions. Bed rest can help relieve pain at first, but it delays healing. Avoid bed rest after the first day of back pain.  Change positions every 30 minutes. If you must sit for long periods of time, take breaks from sitting. Get up and walk around, or lie in a comfortable position.    Try using a heating pad on a low or medium setting for 15 to 20 minutes every 2 or 3 hours. Try a warm shower in place of one session with the heating pad.  You can also try an ice pack for 10 to 15 minutes every 2 to 3 hours. Put a thin cloth between the ice pack and your skin.  Take pain medicines exactly as directed. ? If the doctor gave you a prescription medicine for pain, take it as prescribed. ? If you are not taking a prescription pain medicine, ask your doctor if you can take an over-the-counter medicine.  Take short walks several times a day. You can start with 5 to 10 minutes, 3 or 4 times a day, and work up to longer walks. Walk on level surfaces and avoid hills and stairs until your back is better.  Return to work and other activities as soon as you can. Continued rest without activity is usually not good for your back.  To prevent future back pain, do exercises to stretch and strengthen your back and stomach. Learn how to use good posture, safe lifting techniques, and proper body mechanics. When should you call for help? Call your doctor now or seek immediate medical care if:     You have new or worsening numbness in your legs.      You have new or worsening weakness in your legs. (This could make it hard to stand up.)      You lose control of your bladder or bowels. Watch closely for changes in your health, and be sure to contact your doctor if:     You have a fever, lose weight, or don't feel well.      You do not get better as expected. Where can you learn more? Go to [email or web address].[email or web address]. org and sign in to your [employer] account. Enter O937 in the Code Climate box to learn more about \"Back Pain: Care Instructions. \"     If you do not have an account, please click on the \"Sign Up Now\" link. Current as of: July 1, 2021               Content Version: 13.2  © 8282-3674 Healthwise, Incorporated. Care instructions adapted under license by [facility] [employer] [facility] ([facility]).  If you have questions about a medical condition or this instruction, always ask your healthcare professional. [place] [address] any warranty or liability for your use of this information.

## 2022-05-25 ENCOUNTER — HOSPITAL ENCOUNTER (OUTPATIENT)
Dept: NUCLEAR MEDICINE | Age: 42
Discharge: HOME | End: 2022-05-27
Payer: OTHER GOVERNMENT

## 2022-05-25 ENCOUNTER — HOSPITAL ENCOUNTER (OUTPATIENT)
Dept: NON INVASIVE DIAGNOSTICS | Age: 42
Discharge: HOME | End: 2022-05-27
Payer: OTHER GOVERNMENT

## 2022-05-25 VITALS — RESPIRATION RATE: 17 BRPM | DIASTOLIC BLOOD PRESSURE: 80 MMHG | HEART RATE: 89 BPM | SYSTOLIC BLOOD PRESSURE: 128 MMHG

## 2022-05-25 DIAGNOSIS — R00.2 PALPITATIONS: ICD-10-CM

## 2022-05-25 DIAGNOSIS — R53.83 FATIGUE, UNSPECIFIED TYPE: ICD-10-CM

## 2022-05-25 DIAGNOSIS — Z82.49 FAMILY HISTORY OF HEART DISEASE: ICD-10-CM

## 2022-05-25 DIAGNOSIS — R06.09 EXERTIONAL DYSPNEA: ICD-10-CM

## 2022-05-25 LAB
LV EF: 56 %
LVEF MODALITY: NORMAL

## 2022-05-25 PROCEDURE — 93017 CV STRESS TEST TRACING ONLY: CPT

## 2022-05-25 PROCEDURE — A9500 TC99M SESTAMIBI: HCPCS | Performed by: FAMILY MEDICINE

## 2022-05-25 PROCEDURE — 78452 HT MUSCLE IMAGE SPECT MULT: CPT

## 2022-05-25 PROCEDURE — 3430000000 HC RX DIAGNOSTIC RADIOPHARMACEUTICAL: Performed by: FAMILY MEDICINE

## 2022-05-25 PROCEDURE — 2580000003 HC RX 258: Performed by: FAMILY MEDICINE

## 2022-05-25 PROCEDURE — 6360000002 HC RX W HCPCS: Performed by: FAMILY MEDICINE

## 2022-05-25 RX ORDER — METOPROLOL TARTRATE 5 MG/5ML
5 INJECTION INTRAVENOUS EVERY 5 MIN PRN
Status: DISCONTINUED | OUTPATIENT
Start: 2022-05-25 | End: 2022-05-25 | Stop reason: ALTCHOICE

## 2022-05-25 RX ORDER — NITROGLYCERIN 0.4 MG/1
0.4 TABLET SUBLINGUAL EVERY 5 MIN PRN
Status: DISCONTINUED | OUTPATIENT
Start: 2022-05-25 | End: 2022-05-25 | Stop reason: ALTCHOICE

## 2022-05-25 RX ORDER — SODIUM CHLORIDE 9 MG/ML
500 INJECTION, SOLUTION INTRAVENOUS CONTINUOUS PRN
Status: DISCONTINUED | OUTPATIENT
Start: 2022-05-25 | End: 2022-05-25 | Stop reason: ALTCHOICE

## 2022-05-25 RX ORDER — SODIUM CHLORIDE 0.9 % (FLUSH) 0.9 %
10 SYRINGE (ML) INJECTION PRN
Status: COMPLETED | OUTPATIENT
Start: 2022-05-25 | End: 2022-05-25

## 2022-05-25 RX ORDER — SODIUM CHLORIDE 0.9 % (FLUSH) 0.9 %
5-40 SYRINGE (ML) INJECTION PRN
Status: DISCONTINUED | OUTPATIENT
Start: 2022-05-25 | End: 2022-05-25 | Stop reason: ALTCHOICE

## 2022-05-25 RX ORDER — ATROPINE SULFATE 0.1 MG/ML
0.5 INJECTION INTRAVENOUS EVERY 5 MIN PRN
Status: DISCONTINUED | OUTPATIENT
Start: 2022-05-25 | End: 2022-05-25 | Stop reason: ALTCHOICE

## 2022-05-25 RX ORDER — AMINOPHYLLINE DIHYDRATE 25 MG/ML
50 INJECTION, SOLUTION INTRAVENOUS PRN
Status: DISCONTINUED | OUTPATIENT
Start: 2022-05-25 | End: 2022-05-25 | Stop reason: ALTCHOICE

## 2022-05-25 RX ORDER — ALBUTEROL SULFATE 90 UG/1
2 AEROSOL, METERED RESPIRATORY (INHALATION) PRN
Status: DISCONTINUED | OUTPATIENT
Start: 2022-05-25 | End: 2022-05-25 | Stop reason: ALTCHOICE

## 2022-05-25 RX ADMIN — REGADENOSON 0.4 MG: 0.08 INJECTION, SOLUTION INTRAVENOUS at 09:07

## 2022-05-25 RX ADMIN — SODIUM CHLORIDE, PRESERVATIVE FREE 10 ML: 5 INJECTION INTRAVENOUS at 07:52

## 2022-05-25 RX ADMIN — SODIUM CHLORIDE, PRESERVATIVE FREE 10 ML: 5 INJECTION INTRAVENOUS at 09:15

## 2022-05-25 RX ADMIN — TETRAKIS(2-METHOXYISOBUTYLISOCYANIDE)COPPER(I) TETRAFLUOROBORATE 7.98 MILLICURIE: 1 INJECTION, POWDER, LYOPHILIZED, FOR SOLUTION INTRAVENOUS at 07:52

## 2022-05-25 RX ADMIN — TETRAKIS(2-METHOXYISOBUTYLISOCYANIDE)COPPER(I) TETRAFLUOROBORATE 36 MILLICURIE: 1 INJECTION, POWDER, LYOPHILIZED, FOR SOLUTION INTRAVENOUS at 09:15

## 2022-05-25 RX ADMIN — SODIUM CHLORIDE, PRESERVATIVE FREE 10 ML: 5 INJECTION INTRAVENOUS at 09:07

## 2022-05-25 NOTE — PROGRESS NOTES
Lexiscan stress test completed and reviewed by AIMEE Portillo. Patient experienced dyspnea and lightheadedness, but tolerated test fairly well. PO caffeine provided 2 minutes after injection while in recovery and, per patient, symptoms improved. IV removed. VS returned to baseline, see flow sheets for documented VS throughout procedure.

## 2022-05-25 NOTE — PROGRESS NOTES
Patient present for lexiscan stress test. Educated on procedure. All questions/concerns addressed. Allergies and medication reviewed. Patient prepped for procedure. Stress test will be supervised by AIMEE Quiroga.

## 2022-05-26 NOTE — PROCEDURES
[address]                [id number] [place], [facility] [address]                              CARDIAC STRESS TEST    PATIENT NAME: Pepe Mac                    :        1980  MED REC NO:   [id number]                             ROOM:  ACCOUNT NO:   [de-identified]                           ADMIT DATE: 2022  PROVIDER:     Saintclair Kung    CARDIOVASCULAR DIAGNOSTIC DEPARTMENT    DATE OF STUDY:  2022    ORDERING PROVIDER:  Davion Villegas MD    PRIMARY CARE PROVIDER:  Davion Villegas MD    INTERPRETING PHYSICIAN:  Sarah Ruiz MD    TEST TYPE: LEXISCAN CARDIOLYTE STRESS TEST  INDICATION: SHORTNESS OF BREATH, EKG ABNORMALITIES    RESTING HEART RATE: 64 bpm  RESTING BLOOD PRESSURE: 125/81 mmHg  PEAK HEART RATE: 133 bpm  PEAK BLOOD PRESSURE: 125/81 mmHg    MEDICATION(S) GIVEN: 0.4 mg IV LEXISCAN. PO CAFFEINE. REASON FOR TERMINATION: MEDICATION INFUSION COMPLETE  SYMPTOMS: DYSPNEA AND LIGHTHEADED POST INJECTION. RESOLVED IN RECOVERY. RESTING EKG: ABNORMAL. NORMAL SINUS RHYTHM, NON-SPECIFIC T WAVE  ABNORMALITY. STRESS HEART RESPONSE: NORMAL RESPONSE.  BLOOD PRESSURE RESPONSE: APPROPRIATE. STRESS EKGs: NO CHANGES SEEN. CHEST DISCOMFORT: NO PAIN DURING STRESS. NO PAIN DURING RECOVERY. ISCHEMIC EKG CHANGES: NONE.    EKG IMPRESSION: NEGATIVE ELECTROCARDIOGRAPHICALLY LEXISCAN STRESS TEST. NUCLEAR SCAN TO FOLLOW.     [facility]    D: 2022 6:54:14       T: 2022 6:54:59     SAULO/OSMANY  Job#: [id number]     Doc#: Unknown    CC:    (Retain this field even if not dictated or not decipherable)

## 2022-05-27 DIAGNOSIS — R53.83 FATIGUE, UNSPECIFIED TYPE: ICD-10-CM

## 2022-05-27 DIAGNOSIS — R00.0 TACHYCARDIA: ICD-10-CM

## 2022-05-27 DIAGNOSIS — R00.2 PALPITATIONS: ICD-10-CM

## 2022-05-27 DIAGNOSIS — R06.09 EXERTIONAL DYSPNEA: Primary | ICD-10-CM

## 2022-05-27 DIAGNOSIS — I47.29 IDIOPATHIC VENTRICULAR TACHYCARDIA: ICD-10-CM

## 2022-05-27 DIAGNOSIS — Z82.49 FAMILY HISTORY OF HEART DISEASE: ICD-10-CM

## 2022-06-06 ENCOUNTER — OFFICE VISIT (OUTPATIENT)
Dept: PRIMARY CARE CLINIC | Age: 42
End: 2022-06-06
Payer: OTHER GOVERNMENT

## 2022-06-06 VITALS
BODY MASS INDEX: 32.52 KG/M2 | SYSTOLIC BLOOD PRESSURE: 122 MMHG | OXYGEN SATURATION: 98 % | HEART RATE: 77 BPM | HEIGHT: 68 IN | DIASTOLIC BLOOD PRESSURE: 78 MMHG | WEIGHT: 214.6 LBS

## 2022-06-06 DIAGNOSIS — E78.2 MIXED HYPERLIPIDEMIA: ICD-10-CM

## 2022-06-06 DIAGNOSIS — R06.09 EXERTIONAL DYSPNEA: Primary | ICD-10-CM

## 2022-06-06 DIAGNOSIS — R00.2 PALPITATIONS: ICD-10-CM

## 2022-06-06 DIAGNOSIS — R53.83 FATIGUE, UNSPECIFIED TYPE: ICD-10-CM

## 2022-06-06 DIAGNOSIS — E55.9 VITAMIN D DEFICIENCY: ICD-10-CM

## 2022-06-06 PROCEDURE — 99213 OFFICE O/P EST LOW 20 MIN: CPT | Performed by: FAMILY MEDICINE

## 2022-06-06 RX ORDER — LORATADINE 10 MG/1
10 CAPSULE, LIQUID FILLED ORAL DAILY
COMMUNITY

## 2022-06-06 ASSESSMENT — PATIENT HEALTH QUESTIONNAIRE - PHQ9
SUM OF ALL RESPONSES TO PHQ QUESTIONS 1-9: 0
SUM OF ALL RESPONSES TO PHQ QUESTIONS 1-9: 0
1. LITTLE INTEREST OR PLEASURE IN DOING THINGS: 0
SUM OF ALL RESPONSES TO PHQ9 QUESTIONS 1 & 2: 0
SUM OF ALL RESPONSES TO PHQ QUESTIONS 1-9: 0
2. FEELING DOWN, DEPRESSED OR HOPELESS: 0
SUM OF ALL RESPONSES TO PHQ QUESTIONS 1-9: 0

## 2022-06-06 NOTE — PROGRESS NOTES
lower leg: No edema. Lymphadenopathy:      Cervical: No cervical adenopathy. Skin:     General: Skin is warm and dry. Capillary Refill: Capillary refill takes less than 2 seconds. Neurological:      General: No focal deficit present. Mental Status: She is alert and oriented to person, place, and time. Psychiatric:         Mood and Affect: Mood normal.         Behavior: Behavior normal.         Assessment/Plan:     1. Exertional dyspnea    2. Fatigue, unspecified type    3. Palpitations    4. Mixed hyperlipidemia    5. Vitamin D deficiency          Cyndee Andre was seen today for palpitations and tachycardia. Diagnoses and all orders for this visit:    Exertional dyspnea  -     Amb External Referral To Cardiology    Fatigue, unspecified type  -     Amb External Referral To Cardiology    Palpitations  -     Amb External Referral To Cardiology    Mixed hyperlipidemia  -     Lipid Panel; Future    Vitamin D deficiency  -     Vitamin D 25 Hydroxy; Future      Recommend that we get a cardiologist opinion referral is made. We will recheck her lipids and her vitamin D in 3 months. Gerhardt Shook, MD    Please note that this chart was generated using voice recognition Dragon dictation software. Although every effort was made to ensure the accuracy of this automated transcription, some errors in transcription may have occurred.

## 2022-06-07 ASSESSMENT — ENCOUNTER SYMPTOMS
CHOKING: 0
SHORTNESS OF BREATH: 1
WHEEZING: 0
SORE THROAT: 0
COUGH: 0
CHEST TIGHTNESS: 1
ABDOMINAL PAIN: 0

## 2022-06-10 ENCOUNTER — PATIENT MESSAGE (OUTPATIENT)
Dept: PRIMARY CARE CLINIC | Age: 42
End: 2022-06-10

## 2022-06-13 RX ORDER — CARVEDILOL 25 MG/1
25 TABLET ORAL 2 TIMES DAILY
Qty: 60 TABLET | Refills: 1 | Status: SHIPPED | OUTPATIENT
Start: 2022-06-13 | End: 2022-06-13 | Stop reason: SDUPTHER

## 2022-06-13 RX ORDER — CARVEDILOL 25 MG/1
25 TABLET ORAL 2 TIMES DAILY
Qty: 60 TABLET | Refills: 1 | Status: SHIPPED | OUTPATIENT
Start: 2022-06-13 | End: 2022-08-15 | Stop reason: SDUPTHER

## 2022-06-13 NOTE — TELEPHONE ENCOUNTER
From: Kala Doe  To: Dr. Jefferson Ruvalcaba: 6/10/2022 7:05 PM EDT  Subject: Carvedilol script    I only have about a week left and there are no refills. When you are back in the office on Monday please let me know if you are going to send over a new prescription for the same dosage or if we are changing things. Thanks.

## 2022-06-19 ENCOUNTER — PATIENT MESSAGE (OUTPATIENT)
Dept: PRIMARY CARE CLINIC | Age: 42
End: 2022-06-19

## 2022-06-20 RX ORDER — PROGESTERONE 100 MG/1
100 CAPSULE ORAL NIGHTLY
Qty: 30 CAPSULE | Refills: 2 | Status: SHIPPED | OUTPATIENT
Start: 2022-06-20 | End: 2022-09-24 | Stop reason: SDUPTHER

## 2022-06-20 NOTE — TELEPHONE ENCOUNTER
From: Kely Perez  To: Dr. Yan Pritchard: 6/19/2022 8:45 PM EDT  Subject: Pharmacy refill request    Pharmacy should have sent you a request for a refill on my Progesterone 100mg last week. Can you either confirm their request or send a new script please. Thanks.

## 2022-08-15 RX ORDER — LEVOTHYROXINE SODIUM 175 UG/1
175 TABLET ORAL DAILY
Qty: 30 TABLET | Refills: 1 | Status: SHIPPED | OUTPATIENT
Start: 2022-08-15

## 2022-08-15 RX ORDER — CARVEDILOL 25 MG/1
25 TABLET ORAL 2 TIMES DAILY
Qty: 60 TABLET | Refills: 1 | Status: SHIPPED | OUTPATIENT
Start: 2022-08-15 | End: 2022-09-06

## 2022-09-01 DIAGNOSIS — E03.9 HYPOTHYROIDISM, UNSPECIFIED TYPE: Primary | ICD-10-CM

## 2022-09-02 ENCOUNTER — HOSPITAL ENCOUNTER (OUTPATIENT)
Age: 42
Setting detail: SPECIMEN
Discharge: HOME | End: 2022-09-02

## 2022-09-02 DIAGNOSIS — E78.2 MIXED HYPERLIPIDEMIA: ICD-10-CM

## 2022-09-02 DIAGNOSIS — E03.9 HYPOTHYROIDISM, UNSPECIFIED TYPE: ICD-10-CM

## 2022-09-02 DIAGNOSIS — E55.9 VITAMIN D DEFICIENCY: ICD-10-CM

## 2022-09-02 LAB
CHOLESTEROL/HDL RATIO: 3.1
CHOLESTEROL: 172 MG/DL
HDLC SERPL-MCNC: 56 MG/DL
LDL CHOLESTEROL: 101 MG/DL (ref 0–130)
THYROXINE, FREE: 2.05 NG/DL (ref 0.93–1.7)
TRIGL SERPL-MCNC: 75 MG/DL
TSH SERPL DL<=0.05 MIU/L-ACNC: 0.12 UIU/ML (ref 0.3–5)
VITAMIN D 25-HYDROXY: 60.4 NG/ML

## 2022-09-06 ENCOUNTER — OFFICE VISIT (OUTPATIENT)
Dept: PRIMARY CARE CLINIC | Age: 42
End: 2022-09-06
Payer: OTHER GOVERNMENT

## 2022-09-06 VITALS
SYSTOLIC BLOOD PRESSURE: 116 MMHG | HEIGHT: 68 IN | OXYGEN SATURATION: 99 % | HEART RATE: 116 BPM | WEIGHT: 216 LBS | DIASTOLIC BLOOD PRESSURE: 78 MMHG | BODY MASS INDEX: 32.74 KG/M2

## 2022-09-06 DIAGNOSIS — E55.9 VITAMIN D DEFICIENCY: ICD-10-CM

## 2022-09-06 DIAGNOSIS — E03.9 HYPOTHYROIDISM, UNSPECIFIED TYPE: Primary | ICD-10-CM

## 2022-09-06 DIAGNOSIS — I47.29 IDIOPATHIC VENTRICULAR TACHYCARDIA: ICD-10-CM

## 2022-09-06 DIAGNOSIS — E78.2 MIXED HYPERLIPIDEMIA: ICD-10-CM

## 2022-09-06 PROCEDURE — 99214 OFFICE O/P EST MOD 30 MIN: CPT | Performed by: NURSE PRACTITIONER

## 2022-09-06 RX ORDER — VERAPAMIL HYDROCHLORIDE 80 MG/1
80 TABLET ORAL DAILY
COMMUNITY
Start: 2022-08-18

## 2022-09-06 RX ORDER — ERGOCALCIFEROL 1.25 MG/1
50000 CAPSULE ORAL WEEKLY
Qty: 4 CAPSULE | Refills: 5 | Status: SHIPPED | OUTPATIENT
Start: 2022-09-06

## 2022-09-06 RX ORDER — LEVOTHYROXINE SODIUM 137 UG/1
137 TABLET ORAL DAILY
Qty: 30 TABLET | Refills: 5 | Status: SHIPPED | OUTPATIENT
Start: 2022-09-06

## 2022-09-06 ASSESSMENT — PATIENT HEALTH QUESTIONNAIRE - PHQ9
SUM OF ALL RESPONSES TO PHQ9 QUESTIONS 1 & 2: 0
SUM OF ALL RESPONSES TO PHQ QUESTIONS 1-9: 0
1. LITTLE INTEREST OR PLEASURE IN DOING THINGS: 0
SUM OF ALL RESPONSES TO PHQ QUESTIONS 1-9: 0
2. FEELING DOWN, DEPRESSED OR HOPELESS: 0
SUM OF ALL RESPONSES TO PHQ QUESTIONS 1-9: 0
SUM OF ALL RESPONSES TO PHQ QUESTIONS 1-9: 0

## 2022-09-06 ASSESSMENT — ENCOUNTER SYMPTOMS
CHEST TIGHTNESS: 0
SHORTNESS OF BREATH: 0
VOMITING: 0
RHINORRHEA: 0
DIARRHEA: 0
NAUSEA: 0
ABDOMINAL PAIN: 0
SORE THROAT: 0
COLOR CHANGE: 0

## 2022-09-06 NOTE — PROGRESS NOTES
[address] [facility] [address] [address]  Dept: [phone]  Dept Fax: [phone]    Meet Caballero is a 39 y.o. female who presentstoday for her medical conditions/complaints as noted below. Meet Caballero is c/o of  Chief Complaint   Patient presents with    Hyperlipidemia     Patient presents to the office for a 3 month follow up to review labs    Palpitations     Patient was referred to cardiology by Dr. Ernst Day at her last visit, she saw Dr. Linda Martinez on 8/10/22; stress test this afternoon and sleep study the end of the month    Mass     Patient states she has a lump in her left forearm that has gotten bigger within the past few months. Hand numb and does cause a bit of pain         HPI:     Here for routine 3 month follow up for chronic conditions  Is established with Dr. Ernst Day since earlier this year when she moved to the area   PMH significant for tachycardia, established with Dr. Sirisha Tee. She was taken off coreg and put on verapamil and has had an increase in tachycardia and feeling \"bad\". Has stress test and sleep study scheduled and upcoming f/u    Levothyroxine dose was adjusted in Feb 2022, has been taking 175mcg 4 x week and 137mcg 3x week  On recheck, TSH remains low.  She reports feeling off and had attributed it to the new medication, but does admit that this is similar to when thyroid is \"off\"     HLD significantly improved since addition of crestor, denies major diet or lifestyle changes due to not feeling well lately  Denies adverse side effects of medication         No results found for: LABA1C          ( goal A1C is < 7)   No results found for: LABMICR  LDL Cholesterol (mg/dL)   Date Value   09/02/2022 101   02/23/2022 185 (H)       (goal LDL is <100)   AST (U/L)   Date Value   02/23/2022 33 (H)     ALT (U/L)   Date Value   02/23/2022 52 (H)     BUN (mg/dL)   Date Value   02/23/2022 13     BP Readings from Last 3 Encounters:   09/06/22 116/78   22 122/78   22 128/80          ([id number])    Past Medical History:   Diagnosis Date    Allergic rhinitis 2005    Anxiety 1999    Cancer [facility])     thyroid    Chronic back pain     Depression     hx of depression and panic attacks, not bad since around     Fibromyalgia     Headache     Hypothyroidism     Idiopathic ventricular tachycardia ([facility] [address]     Obesity       Past Surgical History:   Procedure Laterality Date    COSMETIC SURGERY      Breast implants, implant removal, genioplasty, liposuction    HYSTERECTOMY, TOTAL ABDOMINAL (CERVIX REMOVED)          HYSTERECTOMY, VAGINAL  May 2014    THYROID LOBECTOMY      thyroid cancer; right  and left         Family History   Problem Relation Age of Onset    High Blood Pressure Mother     Arthritis Mother     Depression Mother     High Blood Pressure Father     High Cholesterol Father     Arthritis Father     Depression Father     Cancer Maternal Aunt     Cancer Maternal Uncle     Cancer Paternal Aunt     Cancer Paternal Uncle     Cancer Maternal Grandmother     Arthritis Maternal Grandmother     Heart Attack Maternal Grandmother     Cancer Maternal Grandfather         Melanoma, basal cell carcinoma    Arthritis Maternal Grandfather     Cancer Paternal Grandmother     Arthritis Paternal Grandmother     Stroke Paternal Grandmother     Atrial Fibrillation Paternal Grandmother     Breast Cancer Paternal Grandmother     Depression Paternal Grandmother     High Blood Pressure Paternal Grandmother     High Cholesterol Paternal Grandmother     Cancer Paternal Grandfather         Lung, bladder prostate, mesothelioma    Arthritis Paternal Grandfather     Early Death Other         couple of family members in their 52's of various cancers       Social History     Tobacco Use    Smoking status: Former     Packs/day: 0.50     Years: 0.50     Pack years: 0.25     Types: Cigarettes     Quit date:      Years since quittin.6 Smokeless tobacco: Never   Substance Use Topics    Alcohol use: Yes     Alcohol/week: 3.0 standard drinks     Types: 3 Shots of liquor per week     Comment: socially      Current Outpatient Medications   Medication Sig Dispense Refill    verapamil (CALAN) 80 MG tablet Take 80 mg by mouth daily      levothyroxine (SYNTHROID) 137 MCG tablet Take 1 tablet by mouth daily 30 tablet 5    vitamin D (ERGOCALCIFEROL) 1.25 MG (47051 UT) CAPS capsule Take 1 capsule by mouth once a week 4 capsule 5    levothyroxine (SYNTHROID) 175 MCG tablet Take 1 tablet by mouth in the morning. 30 tablet 1    progesterone (PROMETRIUM) 100 MG CAPS capsule Take 1 capsule by mouth nightly Take daily 30 capsule 2    loratadine (CLARITIN) 10 MG capsule Take 10 mg by mouth daily      ibuprofen (ADVIL;MOTRIN) 600 MG tablet Take 1 tablet by mouth 4 times daily as needed for Pain 120 tablet 0    rosuvastatin (CRESTOR) 10 MG tablet Take 1 tablet by mouth daily (Patient taking differently: Take 10 mg by mouth daily 3x/week) 30 tablet 3     No current facility-administered medications for this visit. Allergies   Allergen Reactions    Cephalexin     Sulfa Antibiotics        Health Maintenance   Topic Date Due    COVID-19 Vaccine (2 - Moderna series) 02/05/2022    DTaP/Tdap/Td vaccine (1 - Tdap) 03/05/2022    Flu vaccine (1) 09/01/2022    Depression Monitoring  06/06/2023    Lipids  09/02/2027    Hepatitis A vaccine  Aged Out    Hepatitis B vaccine  Aged Out    Hib vaccine  Aged Out    Meningococcal (ACWY) vaccine  Aged Out    Pneumococcal 0-64 years Vaccine  Aged Out    Varicella vaccine  Discontinued    Hepatitis C screen  Discontinued    HIV screen  Discontinued       Subjective:      Review of Systems   Constitutional:  Positive for fatigue. Negative for activity change and fever. HENT:  Negative for congestion, rhinorrhea and sore throat. Eyes:  Negative for visual disturbance.    Respiratory:  Negative for chest tightness and shortness of breath. Cardiovascular:  Negative for chest pain and palpitations. Gastrointestinal:  Negative for abdominal pain, diarrhea, nausea and vomiting. Endocrine: Negative for polydipsia. Genitourinary:  Negative for difficulty urinating. Musculoskeletal:  Negative for arthralgias and myalgias. Skin:  Negative for color change. Neurological:  Negative for weakness and headaches. Psychiatric/Behavioral:  Negative for behavioral problems. The patient is not nervous/anxious. All other systems reviewed and are negative. Objective:   /78   Pulse (!) 116   Ht 5' 8\" (1.727 m)   Wt 216 lb (98 kg)   SpO2 99%   BMI 32.84 kg/m²   Physical Exam  Vitals reviewed. Constitutional:       General: She is not in acute distress. Appearance: Normal appearance. HENT:      Head: Normocephalic. Eyes:      Pupils: Pupils are equal, round, and reactive to light. Cardiovascular:      Rate and Rhythm: Regular rhythm. Tachycardia present. Pulses: Normal pulses. Heart sounds: Normal heart sounds. Pulmonary:      Effort: Pulmonary effort is normal.      Breath sounds: Normal breath sounds. Abdominal:      General: There is no distension. Musculoskeletal:         General: Normal range of motion. Cervical back: Neck supple. Right lower leg: No edema. Left lower leg: No edema. Lymphadenopathy:      Cervical: No cervical adenopathy. Skin:     General: Skin is warm and dry. Capillary Refill: Capillary refill takes less than 2 seconds. Neurological:      General: No focal deficit present. Mental Status: She is alert and oriented to person, place, and time. Psychiatric:         Mood and Affect: Mood normal.         Behavior: Behavior normal.         :       Diagnosis Orders   1. Hypothyroidism, unspecified type  levothyroxine (SYNTHROID) 137 MCG tablet    TSH    T4      2. Vitamin D deficiency  vitamin D (ERGOCALCIFEROL) 1.25 MG (40252 UT) CAPS capsule      3. Mixed hyperlipidemia        4. Idiopathic ventricular tachycardia (HCC)                  :          1. Hypothyroidism, unspecified type  Worsening TSH, adjust levothyroxine to 137mcg daily and hold 175mcg dose for now. Will recheck in 6 weeks, lab in chart.   - levothyroxine (SYNTHROID) 137 MCG tablet; Take 1 tablet by mouth daily  Dispense: 30 tablet; Refill: 5  - TSH; Future  - T4; Future    2. Vitamin D deficiency  Improved, continue weekly supplement  - vitamin D (ERGOCALCIFEROL) 1.25 MG (07146 UT) CAPS capsule; Take 1 capsule by mouth once a week  Dispense: 4 capsule; Refill: 5    3. Mixed hyperlipidemia  Improved, continue crestor, low carb and low fat diet encouraged with increased activity     4. Idiopathic ventricular tachycardia ([address]  Following with cardiology, feels worse since DC BB, is tachycardic today. F/u with stress test and sleep study as planned and cards f/u as directed. Return in about 3 months (around 12/6/2022) for HLD, hypothyroid, tachycardia . Patient given educational materials - see patient instructions. Discussed use, benefit, and side effects of prescribed medications. All patient questions answered. Pt voiced understanding. Reviewed health maintenance. Instructed to continue current medications, diet and exercise. Patient agreed with treatment plan. Follow up as directed.        Electronicallysigned by SILKE Nation CNP on 9/6/2022 at 1:39 PM

## 2022-09-26 RX ORDER — PROGESTERONE 100 MG/1
100 CAPSULE ORAL NIGHTLY
Qty: 30 CAPSULE | Refills: 2 | Status: SHIPPED | OUTPATIENT
Start: 2022-09-26

## 2022-09-26 NOTE — TELEPHONE ENCOUNTER
Last visit: 9/6/22  Last Med refill: 6/20/22      Next Visit Date:  Future Appointments   Date Time Provider Linette Ary   12/6/2022  7:30 AM Daniella Madrigal MD [facility] Maintenance   Topic Date Due    COVID-19 Vaccine (2 - Moderna series) 02/05/2022    DTaP/Tdap/Td vaccine (1 - Tdap) 03/05/2022    Flu vaccine (1) 08/01/2022    Depression Monitoring  09/06/2023    Lipids  09/02/2027    Hepatitis A vaccine  Aged Out    Hepatitis B vaccine  Aged Out    Hib vaccine  Aged Out    Meningococcal (ACWY) vaccine  Aged Out    Pneumococcal 0-64 years Vaccine  Aged Out    Varicella vaccine  Discontinued    Hepatitis C screen  Discontinued    HIV screen  Discontinued       No results found for: LABA1C          ( goal A1C is < 7)   No results found for: LABMICR  LDL Cholesterol (mg/dL)   Date Value   09/02/2022 101   02/23/2022 185 (H)       (goal LDL is <100)   AST (U/L)   Date Value   02/23/2022 33 (H)     ALT (U/L)   Date Value   02/23/2022 52 (H)     BUN (mg/dL)   Date Value   02/23/2022 13     BP Readings from Last 3 Encounters:   09/06/22 116/78   06/06/22 122/78   05/25/22 128/80          (goal 120/80)    All Future Testing planned in [facility]  Lab Frequency Next Occurrence   TSH Once 09/06/2022   T4 Once 09/06/2022               Patient Active Problem List:     Depressive disorder     Hypothyroidism     Fibromyalgia     Idiopathic ventricular tachycardia (HCC)     Mixed hyperlipidemia     Vitamin D deficiency

## 2022-10-31 ENCOUNTER — HOSPITAL ENCOUNTER (OUTPATIENT)
Age: 42
Setting detail: SPECIMEN
Discharge: HOME | End: 2022-10-31

## 2022-10-31 DIAGNOSIS — E03.9 HYPOTHYROIDISM, UNSPECIFIED TYPE: ICD-10-CM

## 2022-10-31 LAB — TSH SERPL DL<=0.05 MIU/L-ACNC: 5.49 UIU/ML (ref 0.3–5)

## 2022-11-02 LAB — T4 TOTAL: 7.5 UG/DL (ref 4.5–10.9)

## 2022-11-09 ENCOUNTER — PATIENT MESSAGE (OUTPATIENT)
Dept: PRIMARY CARE CLINIC | Age: 42
End: 2022-11-09

## 2022-11-09 DIAGNOSIS — E03.9 HYPOTHYROIDISM, UNSPECIFIED TYPE: Primary | ICD-10-CM

## 2022-11-09 NOTE — TELEPHONE ENCOUNTER
Advised patient that Dr. Arie Mcpherson added additional lab work (free t4) that he would like her to have done prior to adjusting medication.  Patient verbalized understanding and will stop by for lab draw

## 2022-11-09 NOTE — TELEPHONE ENCOUNTER
From: Kristi Alarcon  To: Dr. Bess Jasson2022 11:54 AM EST  Subject: Recent thyroid labs    Would you like to make any dosage changes given my recent lab results and a return of my fatigue and hair loss? Im currently taking 137mg daily. Im not scheduled to see you for almost a month still so I wanted to check in.

## 2022-11-14 ENCOUNTER — HOSPITAL ENCOUNTER (OUTPATIENT)
Age: 42
Setting detail: SPECIMEN
Discharge: HOME | End: 2022-11-14

## 2022-11-14 DIAGNOSIS — E03.9 HYPOTHYROIDISM, UNSPECIFIED TYPE: ICD-10-CM

## 2022-11-14 LAB — THYROXINE, FREE: 1.49 NG/DL (ref 0.93–1.7)

## 2022-11-30 NOTE — TELEPHONE ENCOUNTER
Last visit: 9/6/22  Last Med refill: 9/26/22  Does patient have enough medication for 72 hours:    Next Visit Date:  Future Appointments   Date Time Provider Linette Mcallister   12/6/2022  7:30 AM Kendall Robledo MD [facility] Maintenance   Topic Date Due    COVID-19 Vaccine (2 - Moderna series) 02/05/2022    DTaP/Tdap/Td vaccine (1 - Tdap) 03/05/2022    Flu vaccine (1) 08/01/2022    Depression Monitoring  09/06/2023    Lipids  09/02/2027    Hepatitis A vaccine  Aged Out    Hib vaccine  Aged Out    Meningococcal (ACWY) vaccine  Aged Out    Pneumococcal 0-64 years Vaccine  Aged Out    Varicella vaccine  Discontinued    Hepatitis C screen  Discontinued    HIV screen  Discontinued       No results found for: LABA1C          ( goal A1C is < 7)   No results found for: LABMICR  LDL Cholesterol (mg/dL)   Date Value   09/02/2022 101   02/23/2022 185 (H)       (goal LDL is <100)   AST (U/L)   Date Value   02/23/2022 33 (H)     ALT (U/L)   Date Value   02/23/2022 52 (H)     BUN (mg/dL)   Date Value   02/23/2022 13     BP Readings from Last 3 Encounters:   09/06/22 116/78   06/06/22 122/78   05/25/22 128/80          (goal 120/80)    All Future Testing planned in [facility]              Patient Active Problem List:     Depressive disorder     Hypothyroidism     Fibromyalgia     Idiopathic ventricular tachycardia     Mixed hyperlipidemia     Vitamin D deficiency

## 2022-12-01 RX ORDER — PROGESTERONE 100 MG/1
100 CAPSULE ORAL NIGHTLY
Qty: 90 CAPSULE | Refills: 1 | Status: SHIPPED | OUTPATIENT
Start: 2022-12-01

## 2022-12-01 RX ORDER — LEVOTHYROXINE SODIUM 175 UG/1
175 TABLET ORAL DAILY
Qty: 90 TABLET | Refills: 1 | OUTPATIENT
Start: 2022-12-01

## 2022-12-01 RX ORDER — ROSUVASTATIN CALCIUM 10 MG/1
10 TABLET, COATED ORAL DAILY
Qty: 90 TABLET | Refills: 1 | Status: SHIPPED | OUTPATIENT
Start: 2022-12-01 | End: 2022-12-31

## 2022-12-06 ENCOUNTER — OFFICE VISIT (OUTPATIENT)
Dept: PRIMARY CARE CLINIC | Age: 42
End: 2022-12-06
Payer: OTHER GOVERNMENT

## 2022-12-06 VITALS
SYSTOLIC BLOOD PRESSURE: 116 MMHG | HEIGHT: 68 IN | OXYGEN SATURATION: 99 % | DIASTOLIC BLOOD PRESSURE: 80 MMHG | WEIGHT: 218 LBS | HEART RATE: 81 BPM | BODY MASS INDEX: 33.04 KG/M2

## 2022-12-06 DIAGNOSIS — E03.9 HYPOTHYROIDISM, UNSPECIFIED TYPE: Primary | ICD-10-CM

## 2022-12-06 DIAGNOSIS — E55.9 VITAMIN D DEFICIENCY: ICD-10-CM

## 2022-12-06 PROCEDURE — 99214 OFFICE O/P EST MOD 30 MIN: CPT | Performed by: FAMILY MEDICINE

## 2022-12-06 RX ORDER — LEVOTHYROXINE SODIUM 137 UG/1
137 TABLET ORAL DAILY
Qty: 30 TABLET | Refills: 0 | Status: SHIPPED | OUTPATIENT
Start: 2022-12-06

## 2022-12-06 RX ORDER — LEVOTHYROXINE SODIUM 137 UG/1
137 TABLET ORAL DAILY
Qty: 30 TABLET | Refills: 5 | Status: SHIPPED | OUTPATIENT
Start: 2022-12-06

## 2022-12-06 RX ORDER — PROGESTERONE 100 MG/1
100 CAPSULE ORAL NIGHTLY
Qty: 90 CAPSULE | Refills: 1 | Status: SHIPPED | OUTPATIENT
Start: 2022-12-06

## 2022-12-06 RX ORDER — METOPROLOL SUCCINATE 50 MG/1
TABLET, EXTENDED RELEASE ORAL
COMMUNITY
Start: 2022-12-01

## 2022-12-06 RX ORDER — L. ACIDOPHILUS/PECTIN, CITRUS 25MM-100MG
1 TABLET ORAL
COMMUNITY

## 2022-12-06 RX ORDER — ROSUVASTATIN CALCIUM 10 MG/1
10 TABLET, COATED ORAL DAILY
Qty: 90 TABLET | Refills: 1 | Status: SHIPPED | OUTPATIENT
Start: 2022-12-06 | End: 2023-01-05

## 2022-12-06 RX ORDER — ERGOCALCIFEROL 1.25 MG/1
50000 CAPSULE ORAL WEEKLY
Qty: 12 CAPSULE | Refills: 1 | Status: SHIPPED | OUTPATIENT
Start: 2022-12-06

## 2022-12-06 ASSESSMENT — PATIENT HEALTH QUESTIONNAIRE - PHQ9
SUM OF ALL RESPONSES TO PHQ QUESTIONS 1-9: 3
SUM OF ALL RESPONSES TO PHQ QUESTIONS 1-9: 3
4. FEELING TIRED OR HAVING LITTLE ENERGY: 2
SUM OF ALL RESPONSES TO PHQ QUESTIONS 1-9: 3
6. FEELING BAD ABOUT YOURSELF - OR THAT YOU ARE A FAILURE OR HAVE LET YOURSELF OR YOUR FAMILY DOWN: 0
SUM OF ALL RESPONSES TO PHQ QUESTIONS 1-9: 3
10. IF YOU CHECKED OFF ANY PROBLEMS, HOW DIFFICULT HAVE THESE PROBLEMS MADE IT FOR YOU TO DO YOUR WORK, TAKE CARE OF THINGS AT HOME, OR GET ALONG WITH OTHER PEOPLE: 0
SUM OF ALL RESPONSES TO PHQ9 QUESTIONS 1 & 2: 0
1. LITTLE INTEREST OR PLEASURE IN DOING THINGS: 0
9. THOUGHTS THAT YOU WOULD BE BETTER OFF DEAD, OR OF HURTING YOURSELF: 0
8. MOVING OR SPEAKING SO SLOWLY THAT OTHER PEOPLE COULD HAVE NOTICED. OR THE OPPOSITE, BEING SO FIGETY OR RESTLESS THAT YOU HAVE BEEN MOVING AROUND A LOT MORE THAN USUAL: 0
3. TROUBLE FALLING OR STAYING ASLEEP: 1
7. TROUBLE CONCENTRATING ON THINGS, SUCH AS READING THE NEWSPAPER OR WATCHING TELEVISION: 0
2. FEELING DOWN, DEPRESSED OR HOPELESS: 0
5. POOR APPETITE OR OVEREATING: 0

## 2022-12-06 ASSESSMENT — ENCOUNTER SYMPTOMS
SHORTNESS OF BREATH: 0
SORE THROAT: 0
ABDOMINAL PAIN: 0
WHEEZING: 0

## 2022-12-06 NOTE — PROGRESS NOTES
Subjective:     Patient ID: Anne Wang is a 43 y.o. female    HPI  Now on Metoprolol  Fatigue   hair loss   no increase in palpitations  Sob with exertion       Taking Synthroid 137 mcg daily  Elevated TSH   nml Free T4   suggests problem with conversion to Free T3     History of thyroid cancer  Metoprolol may cause conversion issues as well  Add Thyrostim  Taking the weekly vitamin D supplement. Low vitamin D may cause problems with her conversion as well  Review of Systems   Constitutional:  Positive for fatigue. Negative for fever. HENT:  Negative for congestion, ear pain and sore throat. Respiratory:  Negative for shortness of breath and wheezing. Cardiovascular:  Negative for chest pain and leg swelling. Gastrointestinal:  Negative for abdominal pain. Genitourinary:  Negative for dysuria. Skin:  Negative for rash. Neurological:  Negative for syncope. Hematological:  Negative for adenopathy. Psychiatric/Behavioral:  Negative for sleep disturbance. The patient is not nervous/anxious. Objective:     Physical Exam  Vitals and nursing note reviewed. Constitutional:       General: She is not in acute distress. Appearance: Normal appearance. HENT:      Head: Normocephalic. Right Ear: External ear normal.      Left Ear: External ear normal.      Nose: Nose normal.      Mouth/Throat:      Mouth: Mucous membranes are moist.   Eyes:      Conjunctiva/sclera: Conjunctivae normal.   Neck:      Comments: No thyromegaly or mass  Cardiovascular:      Rate and Rhythm: Normal rate and regular rhythm. Heart sounds: Normal heart sounds. Pulmonary:      Effort: Pulmonary effort is normal.      Breath sounds: Normal breath sounds. Musculoskeletal:      Cervical back: Normal range of motion and neck supple. Right lower leg: No edema. Left lower leg: No edema. Skin:     General: Skin is warm and dry. Neurological:      General: No focal deficit present.       Mental Status: She is alert and oriented to person, place, and time. Psychiatric:         Mood and Affect: Mood normal.         Behavior: Behavior normal.       Assessment/Plan:     1. Hypothyroidism, unspecified type    2. Vitamin D deficiency          Noel St was seen today for hypothyroidism, shortness of breath, cholesterol problem and other. Diagnoses and all orders for this visit:    Hypothyroidism, unspecified type  Believe that she has a problem with conversion of free T4 to free T3. Recommend that we add thyroid stim. Avoid cruciferous vegetables. We will recheck her free T4 free T3 and a TSH in 3 months then return to office  Vitamin D deficiency  Continue repletion therapy with a vitamin D goal of 60  History of thyroid cancer    Cristina Colvin MD    Please note that this chart was generated using voice recognition Dragon dictation software. Although every effort was made to ensure the accuracy of this automated transcription, some errors in transcription may have occurred.

## 2023-04-13 DIAGNOSIS — E55.9 VITAMIN D DEFICIENCY: ICD-10-CM

## 2023-04-17 RX ORDER — ERGOCALCIFEROL 1.25 MG/1
CAPSULE ORAL
Qty: 12 CAPSULE | Refills: 3 | Status: SHIPPED | OUTPATIENT
Start: 2023-04-17

## 2023-04-28 RX ORDER — PROGESTERONE 100 MG/1
CAPSULE ORAL
Qty: 90 CAPSULE | Refills: 3 | Status: SHIPPED | OUTPATIENT
Start: 2023-04-28

## 2023-04-28 RX ORDER — ROSUVASTATIN CALCIUM 10 MG/1
TABLET, COATED ORAL
Qty: 90 TABLET | Refills: 3 | Status: SHIPPED | OUTPATIENT
Start: 2023-04-28

## 2023-05-24 DIAGNOSIS — E03.9 HYPOTHYROIDISM, UNSPECIFIED TYPE: ICD-10-CM

## 2023-05-24 RX ORDER — LEVOTHYROXINE SODIUM 137 MCG
TABLET ORAL
Qty: 90 TABLET | Refills: 1 | Status: SHIPPED | OUTPATIENT
Start: 2023-05-24

## 2023-05-24 NOTE — TELEPHONE ENCOUNTER
Mervat French is requesting a refill on the following medication(s):  Requested Prescriptions     Pending Prescriptions Disp Refills    SYNTHROID 137 MCG tablet [Pharmacy Med Name: SYNTHROID TABS 137MCG] 90 tablet 1     Sig: TAKE 1 TABLET DAILY       Last Visit Date (If Applicable):  93/6/9611    Next Visit Date:    8/15/2023

## 2023-10-11 DIAGNOSIS — E03.9 HYPOTHYROIDISM, UNSPECIFIED TYPE: ICD-10-CM

## 2023-10-11 NOTE — TELEPHONE ENCOUNTER
Last Visit Date: 12/6/2022   Next Visit Date: Visit date not found     Pt was due for an appt in March, needs to schedule.

## 2023-10-12 RX ORDER — LEVOTHYROXINE SODIUM 137 MCG
TABLET ORAL
Qty: 90 TABLET | Refills: 3 | Status: SHIPPED | OUTPATIENT
Start: 2023-10-12

## 2023-10-12 NOTE — TELEPHONE ENCOUNTER
Jennifer Powell is requesting a refill on the following medication(s):  Requested Prescriptions     Pending Prescriptions Disp Refills    SYNTHROID 137 MCG tablet [Pharmacy Med Name: SYNTHROID TABS 137MCG] 90 tablet 3     Sig: TAKE 1 TABLET DAILY       Last Visit Date (If Applicable):  83/1/1288    Next Visit Date:    1/23/2024    Last refill - 05/24/2023

## 2024-01-22 SDOH — ECONOMIC STABILITY: HOUSING INSECURITY
IN THE LAST 12 MONTHS, WAS THERE A TIME WHEN YOU DID NOT HAVE A STEADY PLACE TO SLEEP OR SLEPT IN A SHELTER (INCLUDING NOW)?: NO

## 2024-01-22 SDOH — ECONOMIC STABILITY: TRANSPORTATION INSECURITY
IN THE PAST 12 MONTHS, HAS LACK OF TRANSPORTATION KEPT YOU FROM MEETINGS, WORK, OR FROM GETTING THINGS NEEDED FOR DAILY LIVING?: NO

## 2024-01-22 SDOH — ECONOMIC STABILITY: INCOME INSECURITY: HOW HARD IS IT FOR YOU TO PAY FOR THE VERY BASICS LIKE FOOD, HOUSING, MEDICAL CARE, AND HEATING?: NOT HARD AT ALL

## 2024-01-22 SDOH — ECONOMIC STABILITY: FOOD INSECURITY: WITHIN THE PAST 12 MONTHS, YOU WORRIED THAT YOUR FOOD WOULD RUN OUT BEFORE YOU GOT MONEY TO BUY MORE.: NEVER TRUE

## 2024-01-22 SDOH — ECONOMIC STABILITY: FOOD INSECURITY: WITHIN THE PAST 12 MONTHS, THE FOOD YOU BOUGHT JUST DIDN'T LAST AND YOU DIDN'T HAVE MONEY TO GET MORE.: NEVER TRUE

## 2024-01-22 ASSESSMENT — PATIENT HEALTH QUESTIONNAIRE - PHQ9
4. FEELING TIRED OR HAVING LITTLE ENERGY: SEVERAL DAYS
4. FEELING TIRED OR HAVING LITTLE ENERGY: 1
2. FEELING DOWN, DEPRESSED OR HOPELESS: 0
7. TROUBLE CONCENTRATING ON THINGS, SUCH AS READING THE NEWSPAPER OR WATCHING TELEVISION: 1
9. THOUGHTS THAT YOU WOULD BE BETTER OFF DEAD, OR OF HURTING YOURSELF: NOT AT ALL
7. TROUBLE CONCENTRATING ON THINGS, SUCH AS READING THE NEWSPAPER OR WATCHING TELEVISION: SEVERAL DAYS
8. MOVING OR SPEAKING SO SLOWLY THAT OTHER PEOPLE COULD HAVE NOTICED. OR THE OPPOSITE - BEING SO FIDGETY OR RESTLESS THAT YOU HAVE BEEN MOVING AROUND A LOT MORE THAN USUAL: NOT AT ALL
SUM OF ALL RESPONSES TO PHQ QUESTIONS 1-9: 2
SUM OF ALL RESPONSES TO PHQ QUESTIONS 1-9: 2
9. THOUGHTS THAT YOU WOULD BE BETTER OFF DEAD, OR OF HURTING YOURSELF: 0
SUM OF ALL RESPONSES TO PHQ QUESTIONS 1-9: 2
8. MOVING OR SPEAKING SO SLOWLY THAT OTHER PEOPLE COULD HAVE NOTICED. OR THE OPPOSITE, BEING SO FIGETY OR RESTLESS THAT YOU HAVE BEEN MOVING AROUND A LOT MORE THAN USUAL: 0
1. LITTLE INTEREST OR PLEASURE IN DOING THINGS: 0
5. POOR APPETITE OR OVEREATING: 0
SUM OF ALL RESPONSES TO PHQ9 QUESTIONS 1 & 2: 0
5. POOR APPETITE OR OVEREATING: NOT AT ALL
3. TROUBLE FALLING OR STAYING ASLEEP: 0
SUM OF ALL RESPONSES TO PHQ QUESTIONS 1-9: 2
6. FEELING BAD ABOUT YOURSELF - OR THAT YOU ARE A FAILURE OR HAVE LET YOURSELF OR YOUR FAMILY DOWN: NOT AT ALL
6. FEELING BAD ABOUT YOURSELF - OR THAT YOU ARE A FAILURE OR HAVE LET YOURSELF OR YOUR FAMILY DOWN: 0
10. IF YOU CHECKED OFF ANY PROBLEMS, HOW DIFFICULT HAVE THESE PROBLEMS MADE IT FOR YOU TO DO YOUR WORK, TAKE CARE OF THINGS AT HOME, OR GET ALONG WITH OTHER PEOPLE: 0
10. IF YOU CHECKED OFF ANY PROBLEMS, HOW DIFFICULT HAVE THESE PROBLEMS MADE IT FOR YOU TO DO YOUR WORK, TAKE CARE OF THINGS AT HOME, OR GET ALONG WITH OTHER PEOPLE: NOT DIFFICULT AT ALL
1. LITTLE INTEREST OR PLEASURE IN DOING THINGS: NOT AT ALL
SUM OF ALL RESPONSES TO PHQ QUESTIONS 1-9: 2
3. TROUBLE FALLING OR STAYING ASLEEP: NOT AT ALL
2. FEELING DOWN, DEPRESSED OR HOPELESS: NOT AT ALL

## 2024-01-23 ENCOUNTER — OFFICE VISIT (OUTPATIENT)
Dept: PRIMARY CARE CLINIC | Age: 44
End: 2024-01-23
Payer: OTHER GOVERNMENT

## 2024-01-23 ENCOUNTER — HOSPITAL ENCOUNTER (OUTPATIENT)
Age: 44
Setting detail: SPECIMEN
Discharge: HOME | End: 2024-01-23

## 2024-01-23 VITALS
OXYGEN SATURATION: 97 % | HEART RATE: 87 BPM | WEIGHT: 199 LBS | DIASTOLIC BLOOD PRESSURE: 68 MMHG | SYSTOLIC BLOOD PRESSURE: 114 MMHG | HEIGHT: 68 IN | BODY MASS INDEX: 30.16 KG/M2

## 2024-01-23 DIAGNOSIS — E03.9 HYPOTHYROIDISM, UNSPECIFIED TYPE: Primary | ICD-10-CM

## 2024-01-23 DIAGNOSIS — R23.2 HOT FLASHES: ICD-10-CM

## 2024-01-23 DIAGNOSIS — E03.9 HYPOTHYROIDISM, UNSPECIFIED TYPE: ICD-10-CM

## 2024-01-23 DIAGNOSIS — I47.29 IDIOPATHIC VENTRICULAR TACHYCARDIA (HCC): ICD-10-CM

## 2024-01-23 DIAGNOSIS — E55.9 VITAMIN D DEFICIENCY: ICD-10-CM

## 2024-01-23 DIAGNOSIS — E78.2 MIXED HYPERLIPIDEMIA: ICD-10-CM

## 2024-01-23 DIAGNOSIS — Z80.3 FAMILY HISTORY OF BREAST CANCER: ICD-10-CM

## 2024-01-23 LAB
25(OH)D3 SERPL-MCNC: 52.8 NG/ML
ALBUMIN SERPL-MCNC: 4.5 G/DL (ref 3.5–5.2)
ALBUMIN/GLOB SERPL: 1.9 {RATIO} (ref 1–2.5)
ALP SERPL-CCNC: 55 U/L (ref 35–104)
ALT SERPL-CCNC: 19 U/L (ref 5–33)
ANION GAP SERPL CALCULATED.3IONS-SCNC: 7 MMOL/L (ref 9–17)
AST SERPL-CCNC: 19 U/L
BASOPHILS # BLD: 0.04 K/UL (ref 0–0.2)
BASOPHILS NFR BLD: 1 % (ref 0–2)
BILIRUB SERPL-MCNC: 0.4 MG/DL (ref 0.3–1.2)
BUN SERPL-MCNC: 16 MG/DL (ref 6–20)
CALCIUM SERPL-MCNC: 9.3 MG/DL (ref 8.6–10.4)
CHLORIDE SERPL-SCNC: 105 MMOL/L (ref 98–107)
CHOLEST SERPL-MCNC: 173 MG/DL
CHOLESTEROL/HDL RATIO: 3.4
CO2 SERPL-SCNC: 28 MMOL/L (ref 20–31)
CREAT SERPL-MCNC: 0.7 MG/DL (ref 0.5–0.9)
EOSINOPHIL # BLD: 0.17 K/UL (ref 0–0.44)
EOSINOPHILS RELATIVE PERCENT: 4 % (ref 1–4)
ERYTHROCYTE [DISTWIDTH] IN BLOOD BY AUTOMATED COUNT: 12.7 % (ref 11.8–14.4)
ESTRADIOL LEVEL: <5 PG/ML
GFR SERPL CREATININE-BSD FRML MDRD: >60 ML/MIN/1.73M2
GLUCOSE SERPL-MCNC: 84 MG/DL (ref 70–99)
HCT VFR BLD AUTO: 41.3 % (ref 36.3–47.1)
HDLC SERPL-MCNC: 51 MG/DL
HGB BLD-MCNC: 13.4 G/DL (ref 11.9–15.1)
IMM GRANULOCYTES # BLD AUTO: <0.03 K/UL (ref 0–0.3)
IMM GRANULOCYTES NFR BLD: 0 %
LDLC SERPL CALC-MCNC: 106 MG/DL (ref 0–130)
LYMPHOCYTES NFR BLD: 1.89 K/UL (ref 1.1–3.7)
LYMPHOCYTES RELATIVE PERCENT: 44 % (ref 24–43)
MCH RBC QN AUTO: 29.5 PG (ref 25.2–33.5)
MCHC RBC AUTO-ENTMCNC: 32.4 G/DL (ref 28.4–34.8)
MCV RBC AUTO: 91 FL (ref 82.6–102.9)
MONOCYTES NFR BLD: 0.24 K/UL (ref 0.1–1.2)
MONOCYTES NFR BLD: 6 % (ref 3–12)
NEUTROPHILS NFR BLD: 45 % (ref 36–65)
NEUTS SEG NFR BLD: 1.98 K/UL (ref 1.5–8.1)
NRBC BLD-RTO: 0 PER 100 WBC
PLATELET # BLD AUTO: 188 K/UL (ref 138–453)
PMV BLD AUTO: 11.5 FL (ref 8.1–13.5)
POTASSIUM SERPL-SCNC: 4.5 MMOL/L (ref 3.7–5.3)
PROT SERPL-MCNC: 6.9 G/DL (ref 6.4–8.3)
RBC # BLD AUTO: 4.54 M/UL (ref 3.95–5.11)
SODIUM SERPL-SCNC: 140 MMOL/L (ref 135–144)
T3FREE SERPL-MCNC: 2.45 PG/ML (ref 2.02–4.43)
T4 FREE SERPL-MCNC: 1.5 NG/DL (ref 0.9–1.7)
TRIGL SERPL-MCNC: 79 MG/DL
TSH SERPL DL<=0.05 MIU/L-ACNC: 5.75 UIU/ML (ref 0.3–5)
WBC OTHER # BLD: 4.3 K/UL (ref 3.5–11.3)

## 2024-01-23 PROCEDURE — 99214 OFFICE O/P EST MOD 30 MIN: CPT | Performed by: FAMILY MEDICINE

## 2024-01-23 RX ORDER — ROSUVASTATIN CALCIUM 10 MG/1
10 TABLET, COATED ORAL DAILY
Qty: 90 TABLET | Refills: 1 | Status: SHIPPED | OUTPATIENT
Start: 2024-01-23

## 2024-01-23 RX ORDER — PROGESTERONE 100 MG/1
CAPSULE ORAL
Qty: 90 CAPSULE | Refills: 1 | Status: SHIPPED | OUTPATIENT
Start: 2024-01-23 | End: 2024-07-22

## 2024-01-23 RX ORDER — ERGOCALCIFEROL 1.25 MG/1
CAPSULE ORAL
Qty: 12 CAPSULE | Refills: 1 | Status: SHIPPED | OUTPATIENT
Start: 2024-01-23 | End: 2024-07-22

## 2024-01-23 RX ORDER — LEVOTHYROXINE SODIUM 137 UG/1
137 TABLET ORAL DAILY
Qty: 90 TABLET | Refills: 1 | Status: SHIPPED | OUTPATIENT
Start: 2024-01-23 | End: 2024-07-21

## 2024-01-23 ASSESSMENT — ENCOUNTER SYMPTOMS
WHEEZING: 0
SORE THROAT: 0
ABDOMINAL PAIN: 0
SHORTNESS OF BREATH: 0

## 2024-01-23 NOTE — PROGRESS NOTES
Normal rate and regular rhythm.      Pulses: Normal pulses.      Heart sounds: Normal heart sounds. No murmur heard.  Pulmonary:      Effort: Pulmonary effort is normal.      Breath sounds: Normal breath sounds. No wheezing.   Musculoskeletal:         General: Normal range of motion.      Cervical back: Neck supple.      Right lower leg: No edema.      Left lower leg: No edema.   Lymphadenopathy:      Cervical: No cervical adenopathy.   Skin:     General: Skin is warm and dry.      Capillary Refill: Capillary refill takes less than 2 seconds.   Neurological:      General: No focal deficit present.      Mental Status: She is alert and oriented to person, place, and time.   Psychiatric:         Mood and Affect: Mood normal.         Behavior: Behavior normal.         Assessment/Plan:     1. Hypothyroidism, unspecified type    2. Vitamin D deficiency    3. Mixed hyperlipidemia    4. Hot flashes    5. Idiopathic ventricular tachycardia (HCC)    6. Family history of breast cancer          Sera was seen today for hypothyroidism, vitamin d def and foot pain.    Diagnoses and all orders for this visit:    Hypothyroidism, unspecified type  -     levothyroxine (SYNTHROID) 137 MCG tablet; Take 1 tablet by mouth daily  -     T4, Free; Future  -     T3, Free; Future  -     TSH; Future    Vitamin D deficiency  -     vitamin D (ERGOCALCIFEROL) 1.25 MG (58592 UT) CAPS capsule; TAKE 1 CAPSULE ONCE A WEEK  -     Vitamin D 25 Hydroxy; Future    Mixed hyperlipidemia  -     rosuvastatin (CRESTOR) 10 MG tablet; Take 1 tablet by mouth daily  -     Lipid Panel; Future    Hot flashes  -     progesterone (PROMETRIUM) 100 MG CAPS capsule; TAKE 1 CAPSULE EVERY DAY AT NIGHT  -     CBC with Auto Differential; Future  -     Comprehensive Metabolic Panel; Future  -     Estradiol; Future    Idiopathic ventricular tachycardia (HCC)  Continue beta-blocker  Family history of breast cancer  -     SEE DIGITAL SCREEN W OR WO CAD BILATERAL;

## 2024-04-04 ENCOUNTER — TELEPHONE (OUTPATIENT)
Dept: PRIMARY CARE CLINIC | Age: 44
End: 2024-04-04

## 2024-04-04 RX ORDER — METOPROLOL SUCCINATE 50 MG/1
50 TABLET, EXTENDED RELEASE ORAL DAILY
Qty: 30 TABLET | Refills: 0 | Status: SHIPPED | OUTPATIENT
Start: 2024-04-04

## 2024-04-04 NOTE — TELEPHONE ENCOUNTER
Same with all [facility] patients - we can provide a #30 fill, but then she needs to establish with new PCP

## 2024-05-09 RX ORDER — METOPROLOL SUCCINATE 50 MG/1
50 TABLET, EXTENDED RELEASE ORAL DAILY
Qty: 30 TABLET | Refills: 0 | OUTPATIENT
Start: 2024-05-09